# Patient Record
Sex: FEMALE | Race: WHITE | NOT HISPANIC OR LATINO | Employment: FULL TIME | URBAN - METROPOLITAN AREA
[De-identification: names, ages, dates, MRNs, and addresses within clinical notes are randomized per-mention and may not be internally consistent; named-entity substitution may affect disease eponyms.]

---

## 2017-01-03 ENCOUNTER — CLINICAL SUPPORT (OUTPATIENT)
Dept: OBSTETRICS AND GYNECOLOGY | Facility: CLINIC | Age: 22
End: 2017-01-03
Payer: MEDICAID

## 2017-01-03 ENCOUNTER — ROUTINE PRENATAL (OUTPATIENT)
Dept: OBSTETRICS AND GYNECOLOGY | Facility: CLINIC | Age: 22
End: 2017-01-03
Payer: MEDICAID

## 2017-01-03 VITALS
WEIGHT: 190 LBS | SYSTOLIC BLOOD PRESSURE: 110 MMHG | BODY MASS INDEX: 31.62 KG/M2 | HEART RATE: 78 BPM | DIASTOLIC BLOOD PRESSURE: 70 MMHG

## 2017-01-03 DIAGNOSIS — Z34.83 ENCOUNTER FOR SUPERVISION OF OTHER NORMAL PREGNANCY IN THIRD TRIMESTER: Primary | ICD-10-CM

## 2017-01-03 DIAGNOSIS — Z23 NEED FOR TDAP VACCINATION: ICD-10-CM

## 2017-01-03 DIAGNOSIS — O99.810 ABNORMAL GLUCOSE TOLERANCE TEST (GTT) DURING PREGNANCY, ANTEPARTUM: ICD-10-CM

## 2017-01-03 DIAGNOSIS — Z23 NEED FOR TDAP VACCINATION: Primary | ICD-10-CM

## 2017-01-03 PROCEDURE — 99213 OFFICE O/P EST LOW 20 MIN: CPT | Mod: TH,S$PBB,, | Performed by: OBSTETRICS & GYNECOLOGY

## 2017-01-03 PROCEDURE — 99212 OFFICE O/P EST SF 10 MIN: CPT | Mod: PBBFAC,25 | Performed by: OBSTETRICS & GYNECOLOGY

## 2017-01-03 PROCEDURE — 99999 PR PBB SHADOW E&M-EST. PATIENT-LVL II: CPT | Mod: PBBFAC,,, | Performed by: OBSTETRICS & GYNECOLOGY

## 2017-01-03 NOTE — MR AVS SNAPSHOT
[facility]  [address] [address]  Phone: [phone]                  Nolvia Ray   1/3/2017 11:00 AM   Routine Prenatal    Description:  Female : 1995   Provider:  Terrie Hawley MD   Department:  [facility]           Reason for Visit     Routine Prenatal Visit           Diagnoses this Visit        Comments    Encounter for supervision of other normal pregnancy in third trimester    -  Primary     Abnormal glucose tolerance test (GTT) during pregnancy, antepartum         Need for Tdap vaccination                To Do List           Future Appointments        Provider Department Dept Phone    1/3/2017 11:20 AM VIKAS, OB GYN NURSE [facility] OB/ -[phone]    2017 11:45 AM Terrie Hawley MD [facility] OB/ -[phone]      Goals (5 Years of Data)     None      [facility]     [facility] - / Assistance  Registered nurses in the [facility] Center provide clinical advisement, health education, appointment booking, and other advisory services.  Call for this free service at [phone].             Medications           Message regarding Medications     Verify the changes and/or additions to your medication regime listed below are the same as discussed with your clinician today.  If any of these changes or additions are incorrect, please notify your healthcare provider.             Verify that the below list of medications is an accurate representation of the medications you are currently taking.  If none reported, the list may be blank. If incorrect, please contact your healthcare provider. Carry this list with you in case of emergency.           Current Medications     PNV62/FA/OM3/DHA/EPA/FISH OIL (PRENATAL GUMMY ORAL) Take by mouth.           Clinical Reference Information           Prenatal Vitals     Enc. Date GA Prenatal Vitals Prenatal Pulse Pain Level Urine Albumin/Glucose Edema Presentation  Dilation/Effacement/Station    1/3/17 29w1d 110/70 / 86.2 kg (190 lb) 29 cm / 137 / Present 78 0 Negative / Negative None / None / None / No      16 27w1d 110/78 / 85.7 kg (189 lb) 27.5 cm / 162 / Present 78 0 Negative / Negative None / None / None      16 24w1d 120/80 / 84.4 kg (186 lb)  / 148 / Present 82 0 Negative / Negative None / None / None      10/27/16 19w3d 110/70 / 80.7 kg (178 lb)  / 158 (u/s) / Present 85 0 Negative / Negative None / None / None      10/7/16 16w4d 104/69 / 80.3 kg (177 lb)  / 148 79 0 Negative / Negative None / None / None      16 12w1d 108/76 / 80.6 kg (177 lb 9.6 oz)  / 167 85 0 Negative / Negative None / None / None         TW.536 kg (10 lb)   Pregravid weight: 81.6 kg (180 lb)   Number of fetuses: 1       Vital Signs - Last Recorded  Most recent update: 1/3/2017 10:56 AM by Irma Armstrong MA    BP Pulse Wt LMP BMI    110/70 78 86.2 kg (190 lb) 2016 31.62 kg/m2      Allergies as of 1/3/2017     No Known Allergies      Immunizations Administered on Date of Encounter - 1/3/2017     Name Date Dose VIS Date Route    TDAP  Incomplete 0.5 mL 2015 Intramuscular      Orders Placed During Today's Visit      Normal Orders This Visit    Tdap Vaccine (Adult)

## 2017-01-03 NOTE — PROGRESS NOTES
Tdap injection given Right Deltoid per order. Patient instructed to wait 20 minutes after injection administration. Patient verbalized understanding.

## 2017-01-03 NOTE — PROGRESS NOTES
Pt without complaints.  Has ~ one cramp each night while trying to get comfortable in bed.  No vaginal bleeding, leakage of fluid, or contractions.  +FM.  Discussed recent DM screen results.  Pt to do 3 hour GTT tomorrow. Counseled on comfort measures in pregnancy.  PTL precautions. Fetal kick counts. Counseled on Tdap; pt desires.

## 2017-01-04 ENCOUNTER — LAB VISIT (OUTPATIENT)
Dept: LAB | Facility: HOSPITAL | Age: 22
End: 2017-01-04
Attending: OBSTETRICS & GYNECOLOGY
Payer: MEDICAID

## 2017-01-04 DIAGNOSIS — O99.810 ABNORMAL GLUCOSE TOLERANCE TEST (GTT) DURING PREGNANCY, ANTEPARTUM: ICD-10-CM

## 2017-01-04 LAB
GLUCOSE SERPL-MCNC: 132 MG/DL
GLUCOSE SERPL-MCNC: 151 MG/DL
GLUCOSE SERPL-MCNC: 201 MG/DL
GLUCOSE SERPL-MCNC: 96 MG/DL

## 2017-01-04 PROCEDURE — 82951 GLUCOSE TOLERANCE TEST (GTT): CPT

## 2017-01-04 PROCEDURE — 36415 COLL VENOUS BLD VENIPUNCTURE: CPT

## 2017-01-05 DIAGNOSIS — O24.410 DIET CONTROLLED GESTATIONAL DIABETES MELLITUS (GDM) IN THIRD TRIMESTER: ICD-10-CM

## 2017-01-09 ENCOUNTER — TELEPHONE (OUTPATIENT)
Dept: OBSTETRICS AND GYNECOLOGY | Facility: CLINIC | Age: 22
End: 2017-01-09

## 2017-01-09 NOTE — TELEPHONE ENCOUNTER
Diabetic referral faxed to [phone] to Kaylan Lloyd Diabetic Nurse at [facility]. Nutrition referral faxed to [phone] to Dorothy Godinez Dietician at [facility]. Confirmation faxes received. Patient will be contact by referral recipient with appointment information.

## 2017-01-11 ENCOUNTER — ROUTINE PRENATAL (OUTPATIENT)
Dept: OBSTETRICS AND GYNECOLOGY | Facility: CLINIC | Age: 22
End: 2017-01-11
Payer: MEDICAID

## 2017-01-11 ENCOUNTER — HOSPITAL ENCOUNTER (OUTPATIENT)
Dept: DIABETES | Facility: HOSPITAL | Age: 22
Discharge: HOME | End: 2017-01-11
Attending: OBSTETRICS & GYNECOLOGY
Payer: MEDICAID

## 2017-01-11 VITALS
WEIGHT: 194 LBS | BODY MASS INDEX: 32.28 KG/M2 | DIASTOLIC BLOOD PRESSURE: 76 MMHG | SYSTOLIC BLOOD PRESSURE: 112 MMHG | HEART RATE: 72 BPM

## 2017-01-11 DIAGNOSIS — Z34.83 ENCOUNTER FOR SUPERVISION OF OTHER NORMAL PREGNANCY IN THIRD TRIMESTER: Primary | ICD-10-CM

## 2017-01-11 DIAGNOSIS — O26.853 SPOTTING AFFECTING PREGNANCY IN THIRD TRIMESTER: ICD-10-CM

## 2017-01-11 DIAGNOSIS — O24.410 DIET CONTROLLED GESTATIONAL DIABETES MELLITUS (GDM) IN THIRD TRIMESTER: ICD-10-CM

## 2017-01-11 PROCEDURE — 99213 OFFICE O/P EST LOW 20 MIN: CPT | Mod: TH,S$PBB,, | Performed by: OBSTETRICS & GYNECOLOGY

## 2017-01-11 PROCEDURE — 99999 PR PBB SHADOW E&M-EST. PATIENT-LVL II: CPT | Mod: PBBFAC,,, | Performed by: OBSTETRICS & GYNECOLOGY

## 2017-01-11 PROCEDURE — 82731 ASSAY OF FETAL FIBRONECTIN: CPT

## 2017-01-11 PROCEDURE — 99212 OFFICE O/P EST SF 10 MIN: CPT | Mod: PBBFAC | Performed by: OBSTETRICS & GYNECOLOGY

## 2017-01-11 NOTE — PROGRESS NOTES
Pt diagnosed with gestational diabetes.  Saw diabetes educator and nutritionist today.  Has already been checking blood glucose levels.  Pt reports one red spot of blood in underwear yesterday.  Denies trauma, intercourse, heavy lifting.  Denies contractions or leakage of fluid.  Reports good FM. FFN sent.  Cervix closed, thick, high. No bleeding noted.   PTL precautions.  Fetal kick counts.  Ultrasound for growth and MVP in conjunction with next visit. Pt further educated on gestational diabetes and management.

## 2017-01-11 NOTE — PROGRESS NOTES
01/11/17 1116   Reason for Assessment   Reason for Assessment physician consult   Diagnosis diabetes diagnosis/complications  (gestational)   Level of Care Low: 1   Nutrition Risk Screen   Nutrition Risk Screen no indicators present   Nutrition/Diet History   Typical Food/Fluid Intake 3 meals and 3 snacks   Food Preferences string cheese   Meal/Snack Patterns pt taught 3 meals and 3 snacks   Use of Vitamin/Mineral/Herbal Supplements prenatal   Typical Activity Patterns moderate intensity   Functional Status ambulatory;able to purchase food;able to prepare meals   Food Allergies (none)   Factors Affecting Nutritional Intake (pregnancy)   Nutrition Support Formula Prior to Admit (none)   Labs/Tests/Procedures/Meds   Pertinent Labs Reviewed reviewed   Pertinent Labs Comments GTT   Pertinent Medications Reviewed reviewed   Physical Findings/Assessment   Overall Physical Appearance (pregnant)   Tubes (no)   Oral/Mouth Cavity all teeth present (age appropriate)   Skin intact   Pregnancy/Lactation Assessed Needs   (Calorie Requirements) 2200   Estimated Energy Requirements Method estimated energy requirements greater than 19 years (pregnant)   Nutrition Prescription Ordered   Current Diet Order 2200 calorie diabetic   Nutrition Order Comments appropriate for pregnancy   Current Nutrition Support Formula Ordered (n/a)   Nutrition Risk   Level of Risk low   Continuum of Care Plan   Referral to Outpatient Services diabetes education  (completed as outpt today; see pt ed)   Malnutrition (Undernutrition) Diagnosis   % Intake of Estimated Energy Needs 75 - 100%   % Meal Intake 100%   Malnutrition Reason (n/a)   Nutrition Diagnosis   Nutrition Problem Altered nutrition related laboratory values   Etiology/Related To see labs GTT   Nutrition Diagnosis Signs/Symptoms As Evidenced By gestational dm   Nutrition Diagnosis Status Continues   Additional Nutrition Diagnosis? no   Recommendations   Recommendation/Intervention follow  meal plan as taught   Goals to level out BS while pregnant   Nutrition Goal Status progressing towards goal   Communication of RD Recs (refer to notes)   Monitor and Evaluation   Food and Nutrient Intake food and beverage intake   Biochemical Data, Medical Tests and Procedures glucose/endocrine profile   Nutrition Follow-up   RD Follow-up? No  (but pt is to call with any questions. kk)

## 2017-01-11 NOTE — PROGRESS NOTES
01/11/17 0000   Diabetes Type   Diabetes Type  Gestational   Diabetes History   Diabetes Diagnosis 0-1 year   Nutrition   Meal Planning 3 meals per day;snacks between meal   Monitoring    Monitoring Other  ([employer]eli[employer])   Self Monitoring  Is monitoring 3 times a day  (I instructed her to monitor 4 times a day (fasting and  1 or 2 hours after 3 main meals)   Blood Glucose Logs Yes   Exercise    Exercise Type walking   Intensity Low   Frequency Daily   Duration 30 min   Current Diabetes Treatment    Current Treatment Diet   Social History   Preferred Learning Method Face to Face;Demonstration   Primary Support Other  (Mother)   Educational Level High School   Occupation Doesn't work. Lives with mother   Smoking Status Ex Smoker   Alcohol Use Never   Barriers to Change   Barriers to Change None   Learning Challenges  None   Readiness to Learn    Readiness to Learn  Eager  (Her mother, Damaris, is with her and both are eager to learn)   Cultural Influences    Cultural Influences  Both deny any   Diabetes Education Visit   Diabetes Education Record Assessment/Progress Initial/Comprehensive   Diabetes Education Assessment/Progress   Acute Complications (preventing, detecting, and treating acute complications) L;DC;W;IS;1;2   Chronic Complications (preventing, detecting, and treating chronic complications) L;DC;IS;1;W;2   Diabetes Disease Process (diabetes disease process and treatment options) L;DC;IS;1;2;W   Nutrition (Incorporating nutritional management into one's lifestyle) DC;IS;1;2  (Is meeting with the Dietician following this session)   Physical Activity (incorporating physical activity into one's lifestyle) L;DC;IS;1;2;W   Medications (states correct name, dose, onset, peak, duration, side effects & timing of meds) DC;IS;1;2  (On no medications at this time)   Monitoring (monitoring blood glucose/other parameters &using results) L;DC;IS;1;2;W   Goal Setting and Problem Solving (verbalizes behavior change strategies &  sets realistic goals) L;DC;IS;1;2;W   Behavior Change (developing personal strategies to health & behavior change) L;IS;1;2   Psychosocial Issues (deveopling personal srategies to address psychosocial concerns) L;DC;IS;1;2   Goals   Monitoring Set   Start Date 01/11/17  (will monitor 4 times daily and maintain log book to bring her to MD appt)   Diabetes Care Plan/Intervention   Education Plan/Intervention In F/U MNT   Diabetes Self-Management Support Plan F/U Prov   Education Units of Time    Time Spent 30 min

## 2017-01-11 NOTE — MR AVS SNAPSHOT
[facility]  [address] [place] [address]  Phone: [phone]                  Nolvia Ray   2017 3:15 PM   Routine Prenatal    Description:  Female : 1995   Provider:  Terrie Hawley MD   Department:  [facility]           Reason for Visit     Routine Prenatal Visit           Diagnoses this Visit        Comments    Encounter for supervision of other normal pregnancy in third trimester    -  Primary     Diet controlled gestational diabetes mellitus (GDM) in third trimester         Spotting affecting pregnancy in third trimester                To Do List           Future Appointments        Provider Department Dept Phone    2017 4:30 PM Terrie Hawley MD [facility] OB/ -[phone]      Goals (5 Years of Data)     None      Follow-Up and Disposition     Return in about 2 weeks (around 2017).      [facility]     [facility] On Call Nurse Care Line -  Assistance  Registered nurses in the [facility] On Call Center provide clinical advisement, health education, appointment booking, and other advisory services.  Call for this free service at [phone].             Medications           Message regarding Medications     Verify the changes and/or additions to your medication regime listed below are the same as discussed with your clinician today.  If any of these changes or additions are incorrect, please notify your healthcare provider.             Verify that the below list of medications is an accurate representation of the medications you are currently taking.  If none reported, the list may be blank. If incorrect, please contact your healthcare provider. Carry this list with you in case of emergency.           Current Medications     PNV62/FA/OM3/DHA/EPA/FISH OIL (PRENATAL GUMMY ORAL) Take by mouth.           Clinical Reference Information           Prenatal Vitals     Enc. Date GA Prenatal Vitals Prenatal Pulse Pain Level Urine  Albumin/Glucose Edema Presentation Dilation/Effacement/Station    17 30w2d 112/76 / 88 kg (194 lb) 31 cm / 140 / Present 72 0 Negative / Negative None / None / None / No  0 / 0 / -5    1/3/17 29w1d 110/70 / 86.2 kg (190 lb) 29 cm / 137 / Present 78 0 Negative / Negative None / None / None / No      16 27w1d 110/78 / 85.7 kg (189 lb) 27.5 cm / 162 / Present 78 0 Negative / Negative None / None / None      16 24w1d 120/80 / 84.4 kg (186 lb)  / 148 / Present 82 0 Negative / Negative None / None / None      10/27/16 19w3d 110/70 / 80.7 kg (178 lb)  / 158 (u/s) / Present 85 0 Negative / Negative None / None / None      10/7/16 16w4d 104/69 / 80.3 kg (177 lb)  / 148 79 0 Negative / Negative None / None / None      16 12w1d 108/76 / 80.6 kg (177 lb 9.6 oz)  / 167 85 0 Negative / Negative None / None / None         TW.35 kg (14 lb)   Pregravid weight: 81.6 kg (180 lb)   Number of fetuses: 1       Vital Signs - Last Recorded  Most recent update: 2017  3:20 PM by Rachele Augustine MA    BP Pulse Wt LMP BMI    112/76 72 88 kg (194 lb) 2016 32.28 kg/m2      Allergies as of 2017     No Known Allergies      Immunizations Administered on Date of Encounter - 2017     None      Orders Placed During Today's Visit     Future Labs/Procedures Expected by Expires    Fetal fibronectin 30 weeks 2 days  2017 3/12/2018    GLUCOSE, 2 HR. PC  2017 3/12/2018    Glucose, fasting  As directed 2018

## 2017-01-12 LAB — FIBRONECTIN FETAL SPEC QL: NEGATIVE

## 2017-01-25 ENCOUNTER — PROCEDURE VISIT (OUTPATIENT)
Dept: OBSTETRICS AND GYNECOLOGY | Facility: CLINIC | Age: 22
End: 2017-01-25
Payer: MEDICAID

## 2017-01-25 ENCOUNTER — ROUTINE PRENATAL (OUTPATIENT)
Dept: OBSTETRICS AND GYNECOLOGY | Facility: CLINIC | Age: 22
End: 2017-01-25
Payer: MEDICAID

## 2017-01-25 ENCOUNTER — TELEPHONE (OUTPATIENT)
Dept: OBSTETRICS AND GYNECOLOGY | Facility: CLINIC | Age: 22
End: 2017-01-25

## 2017-01-25 VITALS
DIASTOLIC BLOOD PRESSURE: 67 MMHG | HEART RATE: 76 BPM | WEIGHT: 194 LBS | BODY MASS INDEX: 32.28 KG/M2 | SYSTOLIC BLOOD PRESSURE: 118 MMHG

## 2017-01-25 DIAGNOSIS — O24.410 DIET CONTROLLED GESTATIONAL DIABETES MELLITUS (GDM) IN THIRD TRIMESTER: Primary | ICD-10-CM

## 2017-01-25 DIAGNOSIS — O24.410 DIET CONTROLLED GESTATIONAL DIABETES MELLITUS (GDM) IN THIRD TRIMESTER: ICD-10-CM

## 2017-01-25 DIAGNOSIS — Z34.83 ENCOUNTER FOR SUPERVISION OF OTHER NORMAL PREGNANCY IN THIRD TRIMESTER: Primary | ICD-10-CM

## 2017-01-25 PROCEDURE — 99213 OFFICE O/P EST LOW 20 MIN: CPT | Mod: TH,S$PBB,, | Performed by: OBSTETRICS & GYNECOLOGY

## 2017-01-25 PROCEDURE — 76805 OB US >/= 14 WKS SNGL FETUS: CPT | Mod: PBBFAC | Performed by: OBSTETRICS & GYNECOLOGY

## 2017-01-25 PROCEDURE — 99999 PR PBB SHADOW E&M-EST. PATIENT-LVL II: CPT | Mod: PBBFAC,,, | Performed by: OBSTETRICS & GYNECOLOGY

## 2017-01-25 PROCEDURE — 76805 OB US >/= 14 WKS SNGL FETUS: CPT | Mod: 26,S$PBB,, | Performed by: OBSTETRICS & GYNECOLOGY

## 2017-01-25 NOTE — TELEPHONE ENCOUNTER
Please schedule patient for ultrasound today prior to her visit this afternoon if there is availability.  Orders placed.

## 2017-01-25 NOTE — PROGRESS NOTES
Pt without complaints.  No vaginal bleeding, leakage of fluid, or contractions.  +FM. Doing well with diet.  Fasting and 2 hour postprandial glucose normal today.  Reviewed today's ultrasound findings. PTL precautions given.  Fetal kick counts.  Will do NST in conjunction with next visit.

## 2017-01-25 NOTE — MR AVS SNAPSHOT
[facility]  [address] [address]  Phone: [phone]                  Nolvia Ray   2017 4:30 PM   Routine Prenatal    Description:  Female : 1995   Provider:  Terrie Hawley MD   Department:  [facility]           Reason for Visit     Routine Prenatal Visit           Diagnoses this Visit        Comments    Encounter for supervision of other normal pregnancy in third trimester    -  Primary     Diet controlled gestational diabetes mellitus (GDM) in third trimester                To Do List           Future Appointments        Provider Department Dept Phone    2017 2:00 PM Eldridge, OB GYN NURSE [facility] OB/ -[phone]    2017 2:30 PM eTrrie Hawley MD [facility] OB/ -[phone]      Goals (5 Years of Data)     None      Follow-Up and Disposition     Return in about 2 weeks (around 2017).      [facility]     [facility] On Call Nurse Care Line -  Assistance  Registered nurses in the [facility] On Call Center provide clinical advisement, health education, appointment booking, and other advisory services.  Call for this free service at [phone].             Medications           Message regarding Medications     Verify the changes and/or additions to your medication regime listed below are the same as discussed with your clinician today.  If any of these changes or additions are incorrect, please notify your healthcare provider.             Verify that the below list of medications is an accurate representation of the medications you are currently taking.  If none reported, the list may be blank. If incorrect, please contact your healthcare provider. Carry this list with you in case of emergency.           Current Medications     PNV62/FA/OM3/DHA/EPA/FISH OIL (PRENATAL GUMMY ORAL) Take by mouth.           Clinical Reference Information           Prenatal Vitals     Enc. Date GA Prenatal Vitals Prenatal Pulse Pain Level  Urine Albumin/Glucose Edema Presentation Dilation/Effacement/Station    17 32w2d 118/67 / 88 kg (194 lb) 32 cm / 144 (u/s) / Present 76 0 Negative / Negative None / None / None / No Vertex     17 30w2d 112/76 / 88 kg (194 lb) 31 cm / 140 / Present 72 0 Negative / Negative None / None / None / No  0 / 0 / -5    1/3/17 29w1d 110/70 / 86.2 kg (190 lb) 29 cm / 137 / Present 78 0 Negative / Negative None / None / None / No      16 27w1d 110/78 / 85.7 kg (189 lb) 27.5 cm / 162 / Present 78 0 Negative / Negative None / None / None      16 24w1d 120/80 / 84.4 kg (186 lb)  / 148 / Present 82 0 Negative / Negative None / None / None      10/27/16 19w3d 110/70 / 80.7 kg (178 lb)  / 158 (u/s) / Present 85 0 Negative / Negative None / None / None      10/7/16 16w4d 104/69 / 80.3 kg (177 lb)  / 148 79 0 Negative / Negative None / None / None      16 12w1d 108/76 / 80.6 kg (177 lb 9.6 oz)  / 167 85 0 Negative / Negative None / None / None         TW.35 kg (14 lb)   Pregravid weight: 81.6 kg (180 lb)   Number of fetuses: 1       Vital Signs - Last Recorded  Most recent update: 2017  4:28 PM by Rachele Augustine MA    BP Pulse Wt LMP BMI    118/67 76 88 kg (194 lb) 2016 32.28 kg/m2      Allergies as of 2017     No Known Allergies      Immunizations Administered on Date of Encounter - 2017     None      Orders Placed During Today's Visit     Future Labs/Procedures Expected by Expires    Fetal non-stress test  2017      [facility] Sign-Up     Activating your [facility] account is as easy as 1-2-3!     1) Visit [email or web address], select Sign Up Now, enter this activation code and your date of birth, then select Next.  [email or web address]  Expires: 3/11/2017  4:55 PM      2) Create a username and password to use when you visit [facility] in the future and select a security question in case you lose your password and select Next.    3) Enter your e-mail address and click Sign  Up!    Additional Information  If you have questions, please e-mail [email or web address] or call [phone] to talk to our [facility] staff. Remember, [facility] is NOT to be used for urgent needs. For medical emergencies, dial 911.

## 2017-02-08 ENCOUNTER — ROUTINE PRENATAL (OUTPATIENT)
Dept: OBSTETRICS AND GYNECOLOGY | Facility: CLINIC | Age: 22
End: 2017-02-08
Payer: MEDICAID

## 2017-02-08 ENCOUNTER — CLINICAL SUPPORT (OUTPATIENT)
Dept: OBSTETRICS AND GYNECOLOGY | Facility: CLINIC | Age: 22
End: 2017-02-08
Payer: MEDICAID

## 2017-02-08 VITALS
SYSTOLIC BLOOD PRESSURE: 122 MMHG | WEIGHT: 199 LBS | BODY MASS INDEX: 33.12 KG/M2 | HEART RATE: 78 BPM | DIASTOLIC BLOOD PRESSURE: 82 MMHG

## 2017-02-08 DIAGNOSIS — O24.410 DIET CONTROLLED GESTATIONAL DIABETES MELLITUS (GDM) IN THIRD TRIMESTER: ICD-10-CM

## 2017-02-08 DIAGNOSIS — Z34.83 ENCOUNTER FOR SUPERVISION OF OTHER NORMAL PREGNANCY IN THIRD TRIMESTER: ICD-10-CM

## 2017-02-08 DIAGNOSIS — Z34.83 ENCOUNTER FOR SUPERVISION OF OTHER NORMAL PREGNANCY IN THIRD TRIMESTER: Primary | ICD-10-CM

## 2017-02-08 LAB
ACCELERATIONS > 10BPM: 4
ACCELERATIONS > 15 BPM: 4
ACOUSTIC STIMULATION: NO
DECELERATIONS: NORMAL
FHR VARIABILITIES: NORMAL
NST ASSESSMENT: REACTIVE
NST BASELINE: 130
NST DURATION: 20 MINUTES
NST INDICATIONS: NORMAL
NST LOCATIONS: NORMAL
NST READ BY: NORMAL
NST RETURN: NORMAL
UTERINE ACTIVITY: YES

## 2017-02-08 PROCEDURE — 99999 PR PBB SHADOW E&M-EST. PATIENT-LVL II: CPT | Mod: PBBFAC,,, | Performed by: OBSTETRICS & GYNECOLOGY

## 2017-02-08 PROCEDURE — 99213 OFFICE O/P EST LOW 20 MIN: CPT | Mod: 25,TH,S$PBB, | Performed by: OBSTETRICS & GYNECOLOGY

## 2017-02-08 PROCEDURE — 59025 FETAL NON-STRESS TEST: CPT | Mod: PBBFAC | Performed by: OBSTETRICS & GYNECOLOGY

## 2017-02-08 PROCEDURE — 59025 FETAL NON-STRESS TEST: CPT | Mod: 26,S$PBB,, | Performed by: OBSTETRICS & GYNECOLOGY

## 2017-02-08 PROCEDURE — 99212 OFFICE O/P EST SF 10 MIN: CPT | Mod: PBBFAC,25 | Performed by: OBSTETRICS & GYNECOLOGY

## 2017-02-08 NOTE — PROGRESS NOTES
Pt without complaints.  No vaginal bleeding, leakage of fluid, or contractions.  +FM.  Blood glucose levels have been within normal range. Pt continuing to follow gestational diabetic diet.  NST reviewed; reactive.  PTL precautions.  Fetal kick counts.  Will do BPP in conjunction with next visit.

## 2017-02-16 ENCOUNTER — ROUTINE PRENATAL (OUTPATIENT)
Dept: OBSTETRICS AND GYNECOLOGY | Facility: CLINIC | Age: 22
End: 2017-02-16
Payer: MEDICAID

## 2017-02-16 ENCOUNTER — PROCEDURE VISIT (OUTPATIENT)
Dept: OBSTETRICS AND GYNECOLOGY | Facility: CLINIC | Age: 22
End: 2017-02-16
Payer: MEDICAID

## 2017-02-16 VITALS
HEART RATE: 77 BPM | DIASTOLIC BLOOD PRESSURE: 68 MMHG | SYSTOLIC BLOOD PRESSURE: 114 MMHG | BODY MASS INDEX: 33.78 KG/M2 | WEIGHT: 203 LBS

## 2017-02-16 DIAGNOSIS — O24.410 DIET CONTROLLED GESTATIONAL DIABETES MELLITUS (GDM) IN THIRD TRIMESTER: ICD-10-CM

## 2017-02-16 DIAGNOSIS — Z34.83 ENCOUNTER FOR SUPERVISION OF OTHER NORMAL PREGNANCY IN THIRD TRIMESTER: Primary | ICD-10-CM

## 2017-02-16 DIAGNOSIS — Z34.83 ENCOUNTER FOR SUPERVISION OF OTHER NORMAL PREGNANCY IN THIRD TRIMESTER: ICD-10-CM

## 2017-02-16 PROCEDURE — 99999 PR PBB SHADOW E&M-EST. PATIENT-LVL II: CPT | Mod: PBBFAC,,, | Performed by: OBSTETRICS & GYNECOLOGY

## 2017-02-16 PROCEDURE — 76818 FETAL BIOPHYS PROFILE W/NST: CPT | Mod: 26,S$PBB,, | Performed by: OBSTETRICS & GYNECOLOGY

## 2017-02-16 PROCEDURE — 99213 OFFICE O/P EST LOW 20 MIN: CPT | Mod: TH,S$PBB,, | Performed by: OBSTETRICS & GYNECOLOGY

## 2017-02-16 PROCEDURE — 76818 FETAL BIOPHYS PROFILE W/NST: CPT | Mod: PBBFAC | Performed by: OBSTETRICS & GYNECOLOGY

## 2017-02-16 NOTE — PROGRESS NOTES
Pt reports irregular contractions.  Denies vaginal bleeding and leakage of fluid.  Reports good FM. Reports blood glucose levels have been in nette limits.  Reviewed today's ultrasound findings.  PTL precautions.  Fetal kick counts. Continue glucose control.

## 2017-02-18 LAB — BACTERIA SPEC AEROBE CULT: NORMAL

## 2017-02-23 ENCOUNTER — CLINICAL SUPPORT (OUTPATIENT)
Dept: OBSTETRICS AND GYNECOLOGY | Facility: CLINIC | Age: 22
End: 2017-02-23
Payer: MEDICAID

## 2017-02-23 ENCOUNTER — ROUTINE PRENATAL (OUTPATIENT)
Dept: OBSTETRICS AND GYNECOLOGY | Facility: CLINIC | Age: 22
End: 2017-02-23
Payer: MEDICAID

## 2017-02-23 VITALS
WEIGHT: 203 LBS | DIASTOLIC BLOOD PRESSURE: 78 MMHG | BODY MASS INDEX: 33.78 KG/M2 | HEART RATE: 76 BPM | SYSTOLIC BLOOD PRESSURE: 120 MMHG

## 2017-02-23 DIAGNOSIS — Z34.83 ENCOUNTER FOR SUPERVISION OF OTHER NORMAL PREGNANCY IN THIRD TRIMESTER: Primary | ICD-10-CM

## 2017-02-23 DIAGNOSIS — O24.410 DIET CONTROLLED GESTATIONAL DIABETES MELLITUS (GDM) IN THIRD TRIMESTER: ICD-10-CM

## 2017-02-23 LAB
ACCELERATIONS > 10BPM: 5
ACCELERATIONS > 15 BPM: 5
ACOUSTIC STIMULATION: NO
DECELERATIONS: NORMAL
FHR VARIABILITIES: NORMAL
NST ASSESSMENT: REACTIVE
NST BASELINE: 125
NST DURATION: 30 MINUTES
NST INDICATIONS: NORMAL
NST LOCATIONS: NORMAL
NST READ BY: NORMAL
NST RETURN: NORMAL
UTERINE ACTIVITY: YES

## 2017-02-23 PROCEDURE — 99212 OFFICE O/P EST SF 10 MIN: CPT | Mod: PBBFAC | Performed by: OBSTETRICS & GYNECOLOGY

## 2017-02-23 PROCEDURE — 99999 PR PBB SHADOW E&M-EST. PATIENT-LVL II: CPT | Mod: PBBFAC,,, | Performed by: OBSTETRICS & GYNECOLOGY

## 2017-02-23 PROCEDURE — 99213 OFFICE O/P EST LOW 20 MIN: CPT | Mod: TH,S$PBB,, | Performed by: OBSTETRICS & GYNECOLOGY

## 2017-02-23 NOTE — PROGRESS NOTES
Pt without complaints.  Blood glucose levels have been in normal range.  Has had irregular contractions. No vaginal bleeding or leakage of fluid. +FM. Counseled on negative GBS results.  NST reactive.  PTL precautions.  Fetal kick counts.  BPP in conjunction with next visit.

## 2017-02-23 NOTE — MR AVS SNAPSHOT
[facility]  [address] [place] [address]  Phone: [phone]                  Nolvia Ray   2017 2:45 PM   Routine Prenatal    Description:  Female : 1995   Provider:  Terrie Hawley MD   Department:  [facility]           Reason for Visit     Routine Prenatal Visit           Diagnoses this Visit        Comments    Encounter for supervision of other normal pregnancy in third trimester    -  Primary     Diet controlled gestational diabetes mellitus (GDM) in third trimester                To Do List           Future Appointments        Provider Department Dept Phone    3/2/2017 10:45 AM Terrie Hawley MD [facility] OB/ -[phone]      Goals (5 Years of Data)     None      Follow-Up and Disposition     Return in about 1 week (around 3/2/2017).      [facility]     [facility] Nurse Care Line -  Assistance  Registered nurses in the [facility] Center provide clinical advisement, health education, appointment booking, and other advisory services.  Call for this free service at [phone].             Medications           Message regarding Medications     Verify the changes and/or additions to your medication regime listed below are the same as discussed with your clinician today.  If any of these changes or additions are incorrect, please notify your healthcare provider.             Verify that the below list of medications is an accurate representation of the medications you are currently taking.  If none reported, the list may be blank. If incorrect, please contact your healthcare provider. Carry this list with you in case of emergency.           Current Medications     PNV62/FA/OM3/DHA/EPA/FISH OIL (PRENATAL GUMMY ORAL) Take by mouth.           Clinical Reference Information           Prenatal Vitals     Enc. Date GA Prenatal Vitals Prenatal Pulse Pain Level Urine Albumin/Glucose Edema Presentation Dilation/Effacement/Station     2/23/17 36w3d 120/78 / 92.1 kg (203 lb) 36 cm / 125 / Present 76 0 Negative / Negative None / None / None / No Vertex 0 / 50 / -4    2/16/17 35w3d 114/68 / 92.1 kg (203 lb) 35 cm / 161 (u/s) / Present 77 0 Negative / Negative None / None / None / No Vertex 0 / 30 / -4    2/8/17 34w2d 122/82 / 90.3 kg (199 lb) 34 cm / 138 / Present 78 0 Negative / Negative None / None / None / No      1/25/17 32w2d 118/67 / 88 kg (194 lb) 32 cm / 144 (u/s) / Present 76 0 Negative / Negative None / None / None / No Vertex     1/11/17 30w2d 112/76 / 88 kg (194 lb) 31 cm / 140 / Present 72 0 Negative / Negative None / None / None / No  0 / 0 / -5    1/3/17 29w1d 110/70 / 86.2 kg (190 lb) 29 cm / 137 / Present 78 0 Negative / Negative None / None / None / No      12/20/16 27w1d 110/78 / 85.7 kg (189 lb) 27.5 cm / 162 / Present 78 0 Negative / Negative None / None / None      11/29/16 24w1d 120/80 / 84.4 kg (186 lb)  / 148 / Present 82 0 Negative / Negative None / None / None      10/27/16 19w3d 110/70 / 80.7 kg (178 lb)  / 158 (u/s) / Present 85 0 Negative / Negative None / None / None      10/7/16 16w4d 104/69 / 80.3 kg (177 lb)  / 148 79 0 Negative / Negative None / None / None      9/6/16 12w1d 108/76 / 80.6 kg (177 lb 9.6 oz)  / 167 85 0 Negative / Negative None / None / None         TWG: 10.4 kg (23 lb)   Pregravid weight: 81.6 kg (180 lb)   Number of fetuses: 1       Your Vitals Were     BP Pulse Weight Last Period BMI    120/78 76 92.1 kg (203 lb) 06/13/2016 33.78 kg/m2      Allergies as of 2/23/2017     No Known Allergies      Immunizations Administered on Date of Encounter - 2/23/2017     None      Orders Placed During Today's Visit      Normal Orders This Visit    Fetal non-stress test     Future Labs/Procedures Expected by Expires    US OB/GYN Procedure (Viewpoint) - Extended List - Future  As directed 2/23/2018      Language Assistance Services     ATTENTION: Language assistance services are available, free of charge. Please  call [phone].      ATENCIÓN: Si habla español, tiene a montoya disposición servicios gratuitos de asistencia lingüística. Llame al [phone].     CHÚ Ý: N?u b?n nói Ti?ng Vi?t, có các d?ch v? h? tr? ngôn ng? mi?n phí dành cho b?n. G?i s? [phone].         [facility] complies with applicable Federal civil rights laws and does not discriminate on the basis of race, color, national origin, age, disability, or sex.

## 2017-03-02 ENCOUNTER — ROUTINE PRENATAL (OUTPATIENT)
Dept: OBSTETRICS AND GYNECOLOGY | Facility: CLINIC | Age: 22
End: 2017-03-02
Payer: MEDICAID

## 2017-03-02 ENCOUNTER — PROCEDURE VISIT (OUTPATIENT)
Dept: OBSTETRICS AND GYNECOLOGY | Facility: CLINIC | Age: 22
End: 2017-03-02
Payer: MEDICAID

## 2017-03-02 VITALS
HEART RATE: 76 BPM | SYSTOLIC BLOOD PRESSURE: 122 MMHG | WEIGHT: 205 LBS | DIASTOLIC BLOOD PRESSURE: 82 MMHG | BODY MASS INDEX: 34.11 KG/M2

## 2017-03-02 DIAGNOSIS — O24.410 DIET CONTROLLED GESTATIONAL DIABETES MELLITUS (GDM) IN THIRD TRIMESTER: ICD-10-CM

## 2017-03-02 DIAGNOSIS — Z34.83 ENCOUNTER FOR SUPERVISION OF OTHER NORMAL PREGNANCY IN THIRD TRIMESTER: Primary | ICD-10-CM

## 2017-03-02 DIAGNOSIS — Z34.83 ENCOUNTER FOR SUPERVISION OF OTHER NORMAL PREGNANCY IN THIRD TRIMESTER: ICD-10-CM

## 2017-03-02 PROCEDURE — 99212 OFFICE O/P EST SF 10 MIN: CPT | Mod: PBBFAC,25 | Performed by: OBSTETRICS & GYNECOLOGY

## 2017-03-02 PROCEDURE — 76818 FETAL BIOPHYS PROFILE W/NST: CPT | Mod: 26,S$PBB,, | Performed by: OBSTETRICS & GYNECOLOGY

## 2017-03-02 PROCEDURE — 99213 OFFICE O/P EST LOW 20 MIN: CPT | Mod: TH,S$PBB,, | Performed by: OBSTETRICS & GYNECOLOGY

## 2017-03-02 PROCEDURE — 99999 PR PBB SHADOW E&M-EST. PATIENT-LVL II: CPT | Mod: PBBFAC,,, | Performed by: OBSTETRICS & GYNECOLOGY

## 2017-03-02 NOTE — PROGRESS NOTES
Pt reports right lower pelvic pressure.  Pt without complaints otherwise.  No vaginal bleeding, leakage of fluid, or contractions.  +FM.  Reports normal blood glucose levels on diet.  Labor precautions.  Fetal kick counts.  BPP today.  NST in conjunction with visit next week.

## 2017-03-02 NOTE — MR AVS SNAPSHOT
[facility] OB/ GYN  [address] [address]  Phone: [phone]                  Nolvia Ray   3/2/2017 10:45 AM   Routine Prenatal    Description:  Female : 1995   Provider:  Terrie Hawley MD   Department:  [facility] OB/ GYN           Reason for Visit     Routine Prenatal Visit           Diagnoses this Visit        Comments    Encounter for supervision of other normal pregnancy in third trimester    -  Primary     Diet controlled gestational diabetes mellitus (GDM) in third trimester                To Do List           Future Appointments        Provider Department Dept Phone    3[facility]10/2017 11:[facility] OB/ -[phone]    3/10/2017 12:00 PM Terrie Hawley MD [facility] OB/ -[phone]      Goals (5 Years of Data)     None      [facility]     [facility] Care [facility] -  Assistance  Registered nurses in the [facility] On Call Center provide clinical advisement, health education, appointment booking, and other advisory services.  Call for this free service at [phone].             Medications           Message regarding Medications     Verify the changes and/or additions to your medication regime listed below are the same as discussed with your clinician today.  If any of these changes or additions are incorrect, please notify your healthcare provider.             Verify that the below list of medications is an accurate representation of the medications you are currently taking.  If none reported, the list may be blank. If incorrect, please contact your healthcare provider. Carry this list with you in case of emergency.           Current Medications     PNV62/FA/OM3/DHA/EPA/FISH OIL (PRENATAL GUMMY ORAL) Take by mouth.           Clinical Reference Information           Prenatal Vitals     Enc. Date GA Prenatal Vitals Prenatal Pulse Pain Level Urine Albumin/Glucose Edema Presentation Dilation/Effacement/Station     3/2/17 37w3d 122/82 / 93 kg (205 lb) 37 cm / 138 / Present 76 0 Negative / Negative None / None / None / No Vertex 0 / 70 / -3    17 36w3d 120/78 / 92.1 kg (203 lb) 36 cm / 125 / Present 76 0 Negative / Negative None / None / None / No Vertex 0 / 50 / -4    17 35w3d 114/68 / 92.1 kg (203 lb) 35 cm / 161 (u/s) / Present 77 0 Negative / Negative None / None / None / No Vertex 0 / 30 / -4    17 34w2d 122/82 / 90.3 kg (199 lb) 34 cm / 138 / Present 78 0 Negative / Negative None / None / None / No      17 32w2d 118/67 / 88 kg (194 lb) 32 cm / 144 (u/s) / Present 76 0 Negative / Negative None / None / None / No Vertex     17 30w2d 112/76 / 88 kg (194 lb) 31 cm / 140 / Present 72 0 Negative / Negative None / None / None / No  0 / 0 / -5    1/3/17 29w1d 110/70 / 86.2 kg (190 lb) 29 cm / 137 / Present 78 0 Negative / Negative None / None / None / No      16 27w1d 110/78 / 85.7 kg (189 lb) 27.5 cm / 162 / Present 78 0 Negative / Negative None / None / None      16 24w1d 120/80 / 84.4 kg (186 lb)  / 148 / Present 82 0 Negative / Negative None / None / None      10/27/16 19w3d 110/70 / 80.7 kg (178 lb)  / 158 (u/s) / Present 85 0 Negative / Negative None / None / None      10/7/16 16w4d 104/69 / 80.3 kg (177 lb)  / 148 79 0 Negative / Negative None / None / None      16 12w1d 108/76 / 80.6 kg (177 lb 9.6 oz)  / 167 85 0 Negative / Negative None / None / None         TW.3 kg (25 lb)   Pregravid weight: 81.6 kg (180 lb)   Number of fetuses: 1       Your Vitals Were     BP                   122/82           Allergies as of 3/2/2017     No Known Allergies      Immunizations Administered on Date of Encounter - 3/2/2017     None      Orders Placed During Today's Visit     Future Labs/Procedures Expected by Expires    Fetal non-stress test  3/2/2017 3/3/2018      Language Assistance Services     ATTENTION: Language assistance services are available, free of charge. Please call  [phone].      ATENCIÓN: Si habla español, tiene a montoya disposición servicios gratuitos de asistencia lingüística. Llame al [phone].     CHÚ Ý: N?u b?n nói Ti?ng Vi?t, có các d?ch v? h? tr? ngôn ng? mi?n phí dành cho b?n. G?i s? [phone].         [place] - OB/ GYN complies with applicable Federal civil rights laws and does not discriminate on the basis of race, color, national origin, age, disability, or sex.

## 2017-03-10 ENCOUNTER — CLINICAL SUPPORT (OUTPATIENT)
Dept: OBSTETRICS AND GYNECOLOGY | Facility: CLINIC | Age: 22
End: 2017-03-10
Payer: MEDICAID

## 2017-03-10 ENCOUNTER — ROUTINE PRENATAL (OUTPATIENT)
Dept: OBSTETRICS AND GYNECOLOGY | Facility: CLINIC | Age: 22
End: 2017-03-10
Payer: MEDICAID

## 2017-03-10 VITALS
SYSTOLIC BLOOD PRESSURE: 118 MMHG | HEART RATE: 78 BPM | WEIGHT: 211 LBS | BODY MASS INDEX: 35.11 KG/M2 | DIASTOLIC BLOOD PRESSURE: 76 MMHG

## 2017-03-10 DIAGNOSIS — Z34.83 ENCOUNTER FOR SUPERVISION OF OTHER NORMAL PREGNANCY IN THIRD TRIMESTER: ICD-10-CM

## 2017-03-10 DIAGNOSIS — O24.410 DIET CONTROLLED GESTATIONAL DIABETES MELLITUS (GDM) IN THIRD TRIMESTER: ICD-10-CM

## 2017-03-10 DIAGNOSIS — Z34.83 ENCOUNTER FOR SUPERVISION OF OTHER NORMAL PREGNANCY IN THIRD TRIMESTER: Primary | ICD-10-CM

## 2017-03-10 LAB
ACCELERATIONS > 10BPM: 6
ACCELERATIONS > 15 BPM: 6
ACOUSTIC STIMULATION: NO
DECELERATIONS: NORMAL
FHR VARIABILITIES: NORMAL
NST ASSESSMENT: REACTIVE
NST BASELINE: 135
NST DURATION: 20 MINUTES
NST INDICATIONS: NORMAL
NST LOCATIONS: NORMAL
NST READ BY: NORMAL
NST RETURN: NORMAL
UTERINE ACTIVITY: YES

## 2017-03-10 PROCEDURE — 59025 FETAL NON-STRESS TEST: CPT | Mod: PBBFAC | Performed by: OBSTETRICS & GYNECOLOGY

## 2017-03-10 PROCEDURE — 59025 FETAL NON-STRESS TEST: CPT | Mod: 26,S$PBB,, | Performed by: OBSTETRICS & GYNECOLOGY

## 2017-03-10 PROCEDURE — 99212 OFFICE O/P EST SF 10 MIN: CPT | Mod: PBBFAC,25 | Performed by: OBSTETRICS & GYNECOLOGY

## 2017-03-10 PROCEDURE — 99999 PR PBB SHADOW E&M-EST. PATIENT-LVL II: CPT | Mod: PBBFAC,,, | Performed by: OBSTETRICS & GYNECOLOGY

## 2017-03-10 PROCEDURE — 99213 OFFICE O/P EST LOW 20 MIN: CPT | Mod: 25,TH,S$PBB, | Performed by: OBSTETRICS & GYNECOLOGY

## 2017-03-10 RX ORDER — OXYTOCIN/RINGER'S LACTATE 20/1000 ML
2 PLASTIC BAG, INJECTION (ML) INTRAVENOUS CONTINUOUS
Status: CANCELLED | OUTPATIENT
Start: 2017-03-22

## 2017-03-10 RX ORDER — KETOROLAC TROMETHAMINE 30 MG/ML
30 INJECTION, SOLUTION INTRAMUSCULAR; INTRAVENOUS EVERY 6 HOURS
Status: CANCELLED | OUTPATIENT
Start: 2017-03-10 | End: 2017-03-11

## 2017-03-10 RX ORDER — SODIUM CHLORIDE, SODIUM LACTATE, POTASSIUM CHLORIDE, CALCIUM CHLORIDE 600; 310; 30; 20 MG/100ML; MG/100ML; MG/100ML; MG/100ML
INJECTION, SOLUTION INTRAVENOUS CONTINUOUS
Status: CANCELLED | OUTPATIENT
Start: 2017-03-10

## 2017-03-10 RX ORDER — BUTORPHANOL TARTRATE 1 MG/ML
1 INJECTION INTRAMUSCULAR; INTRAVENOUS
Status: CANCELLED | OUTPATIENT
Start: 2017-03-10

## 2017-03-10 RX ORDER — ONDANSETRON 2 MG/ML
4 INJECTION INTRAMUSCULAR; INTRAVENOUS EVERY 6 HOURS PRN
Status: CANCELLED | OUTPATIENT
Start: 2017-03-10

## 2017-03-10 RX ORDER — IBUPROFEN 200 MG
800 TABLET ORAL EVERY 8 HOURS
Status: CANCELLED | OUTPATIENT
Start: 2017-03-11

## 2017-03-10 NOTE — MR AVS SNAPSHOT
[place] - OB/ GYN  [address] [address]  Phone: [phone]                  Nolvia Ray   3/10/2017 12:00 PM   Routine Prenatal    Description:  Female : 1995   Provider:  Terrie Hawley MD   Department:  [facility] OB/ GYN           Reason for Visit     Routine Prenatal Visit           Diagnoses this Visit        Comments    Encounter for supervision of other normal pregnancy in third trimester    -  Primary     Diet controlled gestational diabetes mellitus (GDM) in third trimester                To Do List           Future Appointments        Provider Department Dept Phone    3/16/2017 12:00 PM [address] - Ultrasound [phone]    3/16/2017 2:45 PM Terrie Hawley MD [facility] OB/ -[phone]      Goals (5 Years of Data)     None      Follow-Up and Disposition     Return in about 1 week (around 3/17/2017).      [facility]     [facility] On Call Nurse [facility] Line -  Assistance  Registered nurses in the [facility] On Call Center provide clinical advisement, health education, appointment booking, and other advisory services.  Call for this free service at [phone].             Medications           Message regarding Medications     Verify the changes and/or additions to your medication regime listed below are the same as discussed with your clinician today.  If any of these changes or additions are incorrect, please notify your healthcare provider.             Verify that the below list of medications is an accurate representation of the medications you are currently taking.  If none reported, the list may be blank. If incorrect, please contact your healthcare provider. Carry this list with you in case of emergency.           Current Medications     PNV62/FA/OM3/DHA/EPA/FISH OIL (PRENATAL GUMMY ORAL) Take by mouth.           Clinical Reference Information           Prenatal Vitals     Enc. Date GA Prenatal Vitals Prenatal Pulse Pain Level Urine  Albumin/Glucose Edema Presentation Dilation/Effacement/Station    3/10/17 38w4d 118/76 / 95.7 kg (211 lb) 38 cm / 135 / Present 78 0 Negative / Negative None / None / None / No Vertex 0 / 70 / -3    3/2/17 37w3d 122/82 / 93 kg (205 lb) 37 cm / 138 / Present 76 0 Negative / Negative None / None / None / No Vertex 0 / 70 / -3    17 36w3d 120/78 / 92.1 kg (203 lb) 36 cm / 125 / Present 76 0 Negative / Negative None / None / None / No Vertex 0 / 50 / -4    17 35w3d 114/68 / 92.1 kg (203 lb) 35 cm / 161 (u/s) / Present 77 0 Negative / Negative None / None / None / No Vertex 0 / 30 / -4    17 34w2d 122/82 / 90.3 kg (199 lb) 34 cm / 138 / Present 78 0 Negative / Negative None / None / None / No      17 32w2d 118/67 / 88 kg (194 lb) 32 cm / 144 (u/s) / Present 76 0 Negative / Negative None / None / None / No Vertex     17 30w2d 112/76 / 88 kg (194 lb) 31 cm / 140 / Present 72 0 Negative / Negative None / None / None / No  0 / 0 / -5    1/3/17 29w1d 110/70 / 86.2 kg (190 lb) 29 cm / 137 / Present 78 0 Negative / Negative None / None / None / No      16 27w1d 110/78 / 85.7 kg (189 lb) 27.5 cm / 162 / Present 78 0 Negative / Negative None / None / None      16 24w1d 120/80 / 84.4 kg (186 lb)  / 148 / Present 82 0 Negative / Negative None / None / None      10/27/16 19w3d 110/70 / 80.7 kg (178 lb)  / 158 (u/s) / Present 85 0 Negative / Negative None / None / None      10/7/16 16w4d 104/69 / 80.3 kg (177 lb)  / 148 79 0 Negative / Negative None / None / None      9/6/16 12w1d 108/76 / 80.6 kg (177 lb 9.6 oz)  / 167 85 0 Negative / Negative None / None / None         TW.1 kg (31 lb)   Pregravid weight: 81.6 kg (180 lb)   Number of fetuses: 1       Your Vitals Were     BP Pulse Weight Last Period BMI    118/76 78 95.7 kg (211 lb) 2016 35.11 kg/m2      Allergies as of 3/10/2017     No Known Allergies      Immunizations Administered on Date of Encounter - 3/10/2017     None      Orders  Placed During Today's Visit     Future Labs/Procedures Expected by Expires    US OB/GYN Procedure (Viewpoint) - Extended List - Future  As directed 3/10/2018      Language Assistance Services     ATTENTION: Language assistance services are available, free of charge. Please call [phone].      ATENCIÓN: Si damien rea, tiene a montoya disposición servicios gratuitos de asistencia lingüística. Llame al [phone].     CHÚ Ý: N?u b?n nói Ti?ng Vi?t, có các d?ch v? h? tr? ngôn ng? mi?n phí dành cho b?n. G?i s? [phone].         [place] - OB/ GYN complies with applicable Federal civil rights laws and does not discriminate on the basis of race, color, national origin, age, disability, or sex.

## 2017-03-10 NOTE — PROGRESS NOTES
Pt reports she has experienced bleeding gums.  Denies nosebleeds and headaches.  Pt without complaints otherwise.  Feels occasional mild contractions.  No vaginal bleeding or leakage of fluid.  +FM.  Blood glucose levels have been normal. NSt today reactive.  Will do BPP and growth ultrasound in conjunction with next visit.  Labor precautions.  Fetal kick counts.

## 2017-03-16 ENCOUNTER — PROCEDURE VISIT (OUTPATIENT)
Dept: OBSTETRICS AND GYNECOLOGY | Facility: CLINIC | Age: 22
End: 2017-03-16
Payer: MEDICAID

## 2017-03-16 ENCOUNTER — ROUTINE PRENATAL (OUTPATIENT)
Dept: OBSTETRICS AND GYNECOLOGY | Facility: CLINIC | Age: 22
End: 2017-03-16
Payer: MEDICAID

## 2017-03-16 VITALS
HEART RATE: 74 BPM | DIASTOLIC BLOOD PRESSURE: 74 MMHG | SYSTOLIC BLOOD PRESSURE: 118 MMHG | WEIGHT: 208 LBS | BODY MASS INDEX: 34.61 KG/M2

## 2017-03-16 DIAGNOSIS — O24.410 DIET CONTROLLED GESTATIONAL DIABETES MELLITUS (GDM) IN THIRD TRIMESTER: ICD-10-CM

## 2017-03-16 DIAGNOSIS — Z34.83 ENCOUNTER FOR SUPERVISION OF OTHER NORMAL PREGNANCY IN THIRD TRIMESTER: ICD-10-CM

## 2017-03-16 DIAGNOSIS — Z34.83 ENCOUNTER FOR SUPERVISION OF OTHER NORMAL PREGNANCY IN THIRD TRIMESTER: Primary | ICD-10-CM

## 2017-03-16 PROCEDURE — 76818 FETAL BIOPHYS PROFILE W/NST: CPT | Mod: 26,S$PBB,, | Performed by: OBSTETRICS & GYNECOLOGY

## 2017-03-16 PROCEDURE — 76818 FETAL BIOPHYS PROFILE W/NST: CPT | Mod: PBBFAC | Performed by: OBSTETRICS & GYNECOLOGY

## 2017-03-16 PROCEDURE — 99213 OFFICE O/P EST LOW 20 MIN: CPT | Mod: TH,S$PBB,, | Performed by: OBSTETRICS & GYNECOLOGY

## 2017-03-16 PROCEDURE — 99999 PR PBB SHADOW E&M-EST. PATIENT-LVL II: CPT | Mod: PBBFAC,,, | Performed by: OBSTETRICS & GYNECOLOGY

## 2017-03-16 RX ORDER — SODIUM CHLORIDE, SODIUM LACTATE, POTASSIUM CHLORIDE, CALCIUM CHLORIDE 600; 310; 30; 20 MG/100ML; MG/100ML; MG/100ML; MG/100ML
INJECTION, SOLUTION INTRAVENOUS CONTINUOUS
Status: CANCELLED | OUTPATIENT
Start: 2017-03-16

## 2017-03-16 RX ORDER — BUTORPHANOL TARTRATE 1 MG/ML
1 INJECTION INTRAMUSCULAR; INTRAVENOUS
Status: CANCELLED | OUTPATIENT
Start: 2017-03-16

## 2017-03-16 RX ORDER — ONDANSETRON 2 MG/ML
4 INJECTION INTRAMUSCULAR; INTRAVENOUS EVERY 6 HOURS PRN
Status: CANCELLED | OUTPATIENT
Start: 2017-03-16

## 2017-03-16 RX ORDER — OXYTOCIN/RINGER'S LACTATE 20/1000 ML
2 PLASTIC BAG, INJECTION (ML) INTRAVENOUS CONTINUOUS
Status: CANCELLED | OUTPATIENT
Start: 2017-03-22

## 2017-03-16 NOTE — H&P
History and Physical  Obstetrics      SUBJECTIVE:     Nolvia Ray is a 21 y.o.  female  at 39w3d weeks gestation with an Estimated Date of Delivery: 3/20/17 who presents for scheduled induction of labor (40 1/7 weeks at time of scheduled induction).  She has had irregular contractions.  Denies vaginal bleeding and leakage of fluid.  Reports good FM.     She has been followed prenatally with the following prenatal complications:   Patient Active Problem List   Diagnosis    MLA (generalized anxiety disorder)    Social anxiety disorder    Excessive caffeine intake    Encounter for supervision of normal pregnancy in third trimester    Abnormal glucose tolerance test (GTT) during pregnancy, antepartum    Diet controlled gestational diabetes mellitus (GDM) in third trimester         HISTORY:     Prior to Admission medications    Medication Sig Start Date End Date Taking? Authorizing Provider   PNV62/FA/OM3/DHA/EPA/FISH OIL (PRENATAL GUMMY ORAL) Take by mouth.    Historical Provider, MD      Past Medical History:   Diagnosis Date    Anxiety     Miscarriage     Psychiatric problem     anxiety    Therapy      History reviewed. No pertinent surgical history.  Family History   Problem Relation Age of Onset    Hypertension Mother     Hypertension Father     Cancer Paternal Grandmother      lung    Cancer Paternal Grandfather      leukemia    Breast cancer Neg Hx     Colon cancer Neg Hx     Ovarian cancer Neg Hx     Bipolar disorder Neg Hx     Schizophrenia Neg Hx     Suicide Neg Hx      Social History   Substance Use Topics    Smoking status: Former Smoker    Smokeless tobacco: Former User     Quit date: 2016    Alcohol use No     OB History    Para Term  AB SAB TAB Ectopic Multiple Living   2    1 1    0      # Outcome Date GA Lbr Dorian/2nd Weight Sex Delivery Anes PTL Lv   2 Current            1 SAB                   Allergy:   Review of patient's allergies  indicates:  No Known Allergies       Review of Systems:  Negative       OBJECTIVE:   /74  Pulse 74  Wt 94.3 kg (208 lb)  LMP 06/13/2016  BMI 34.61 kg/m2        Physical Exam:  General:  alert,normal appearing gravid female   Skin:  Skin color, texture, turgor normal. No rashes or lesions   HEENT:  conjunctivae/corneas clear. JENIFER   Lungs:  clear to auscultation bilaterally   Heart:  regular rate and rhythm, S1, S2 normal, no murmur, click, rub or gallop   Breasts:   Nipples are protruding and have no nipple discharge. No palpable masses, erythema, skin changes, tenderness, or adenopathy.   Abdomen:  soft, non-tender; bowel sounds normal   Uterine Size:  consistent with dates   Presentations:  cephalic   FHT: 143 BPM   Pelvis: External genitalia: normal external genitalia without lesions  Vaginal: without lesions or discharge   Cervix:     Dilation: 1 cm     Effacement: 70%    Station:  -3    Consistency: medium    Position: posterior       OB Lab History:     Group & Rh   Date Value Ref Range Status   08/09/2016 O POS  Final     Indirect Mabel   Date Value Ref Range Status   08/09/2016 NEG  Final     Lab Results   Component Value Date    WBC 9.13 12/20/2016    HGB 11.3 (L) 12/20/2016    HCT 33.8 (L) 12/20/2016    MCV 85 12/20/2016     12/20/2016     Lab Results   Component Value Date    TSH 3.492 12/20/2016     Lab Results   Component Value Date    RUBELLAIGGAN <5.0 (A) 08/09/2016     Lab Results   Component Value Date    RPR Non-reactive 08/09/2016     HIV 1/2 Ag/Ab   Date Value Ref Range Status   08/09/2016 Negative Negative Final     Hepatitis B Surface Ag   Date Value Ref Range Status   08/09/2016 Negative  Final     Results for orders placed or performed in visit on 12/20/16   OB Glucose Screen   Result Value Ref Range    OB Glucose Screen 154 (H) 70 - 140 mg/dL     Results for orders placed or performed in visit on 02/16/17   Strep B Screen, Vaginal / Rectal   Result Value Ref Range    Strep  B Culture No Group B Streptococcus isolated      No results found for this or any previous visit.  Results for orders placed or performed in visit on 08/09/16   C. trachomatis/N. gonorrhoeae by AMP DNA Cervix   Result Value Ref Range    Chlamydia, Amplified DNA Negative     N gonorrhoeae, amplified DNA Negative        ASSESSMENT/PLAN:     IUP 39w3d gestation (40 1/7 weeks at time of scheduled induction)  Gestational diabetes (diet controlled)    Patient Active Problem List   Diagnosis    MAL (generalized anxiety disorder)    Social anxiety disorder    Excessive caffeine intake    Encounter for supervision of normal pregnancy in third trimester    Abnormal glucose tolerance test (GTT) during pregnancy, antepartum    Diet controlled gestational diabetes mellitus (GDM) in third trimester         PLAN:   1.  Admit to Labor and Delivery.  2.  Will induce via cervidil followed by pitocin and amniotomy.

## 2017-03-16 NOTE — MR AVS SNAPSHOT
[place] - OB/ GYN  [address] [place] [address]  Phone: [phone]                  Nolvia Ray   3/16/2017 2:45 PM   Routine Prenatal    Description:  Female : 1995   Provider:  Terrie Hawley MD   Department:  [place] - OB/ GYN           Reason for Visit     Routine Prenatal Visit           Diagnoses this Visit        Comments    Encounter for supervision of other normal pregnancy in third trimester    -  Primary     Diet controlled gestational diabetes mellitus (GDM) in third trimester                To Do List           Future Appointments        Provider Department Dept Phone    3/16/2017 2:45 PM Terrie Hawley MD [place] - OB/ -[phone]    3/21/2017 6:00 PM INDUCTIONS, [facility] [phone]      Goals (5 Years of Data)     None      Follow-Up and Disposition     Return for scheduled induction of labor.      [facility]     [facility] Nurse Care Line -  Assistance  Registered nurses in the [facility] Center provide clinical advisement, health education, appointment booking, and other advisory services.  Call for this free service at [phone].             Medications           Message regarding Medications     Verify the changes and/or additions to your medication regime listed below are the same as discussed with your clinician today.  If any of these changes or additions are incorrect, please notify your healthcare provider.             Verify that the below list of medications is an accurate representation of the medications you are currently taking.  If none reported, the list may be blank. If incorrect, please contact your healthcare provider. Carry this list with you in case of emergency.           Current Medications     PNV62/FA/OM3/DHA/EPA/FISH OIL (PRENATAL GUMMY ORAL) Take by mouth.           Clinical Reference Information           Prenatal Vitals     Enc. Date GA Prenatal Vitals Prenatal Pulse  Pain Level Urine Albumin/Glucose Edema Presentation Dilation/Effacement/Station    3/16/17 39w3d 118/74 / 94.3 kg (208 lb) 39 cm / 143 (u/s) / Present 74 0 Negative / Negative None / None / None / No Vertex 1  70 / -3    3/10/17 38w4d 118/76 / 95.7 kg (211 lb) 38 cm / 135 / Present 78 0 Negative / Negative None / None / None / No Vertex 0  70 / -3    3/2/17 37w3d 122/82 / 93 kg (205 lb) 37 cm / 138 / Present 76 0 Negative / Negative None / None / None / No Vertex 0  70 / -3    17 36w3d 120/78 / 92.1 kg (203 lb) 36 cm / 125 / Present 76 0 Negative / Negative None / None / None / No Vertex 0 / 50 / -4    17 35w3d 114/68 / 92.1 kg (203 lb) 35 cm / 161 (u/s) / Present 77 0 Negative / Negative None / None / None / No Vertex  30 / -4    17 34w2d 122/82 / 90.3 kg (199 lb) 34 cm / 138 / Present 78 0 Negative / Negative None / None / None / No      17 32w2d 118/67 / 88 kg (194 lb) 32 cm / 144 (u/s) / Present 76 0 Negative / Negative None / None / None / No Vertex     17 30w2d 112/76 / 88 kg (194 lb) 31 cm / 140 / Present 72 0 Negative / Negative None / None / None / No  0 / 0 / -5    1/3/17 29w1d 110/70 / 86.2 kg (190 lb) 29 cm / 137 / Present 78 0 Negative / Negative None / None / None / No      16 27w1d 110/78 / 85.7 kg (189 lb) 27.5 cm / 162 / Present 78 0 Negative / Negative None / None / None      16 24w1d 120/80 / 84.4 kg (186 lb)  / 148 / Present 82 0 Negative / Negative None / None / None      10/27/16 19w3d 110/70 / 80.7 kg (178 lb)  / 158 (u/s) / Present 85 0 Negative / Negative None / None / None      10/7/16 16w4d 104/69 / 80.3 kg (177 lb)  / 148 79 0 Negative / Negative None / None / None      16 12w1d 108/76 / 80.6 kg (177 lb 9.6 oz)  / 167 85 0 Negative / Negative None / None / None         TW.7 kg (28 lb)   Pregravid weight: 81.6 kg (180 lb)   Number of fetuses: 1       Your Vitals Were     BP Pulse Weight Last Period BMI    118/74 74 94.3 kg (208 lb)  06/13/2016 34.61 kg/m2      Allergies as of 3/16/2017     No Known Allergies      Immunizations Administered on Date of Encounter - 3/16/2017     None      Language Assistance Services     ATTENTION: Language assistance services are available, free of charge. Please call [phone].      ATENCIÓN: Si habla rona, tiene a montoya disposición servicios gratuitos de asistencia lingüística. Llame al [phone].     CHÚ Ý: N?u b?n nói Ti?ng Vi?t, có các d?ch v? h? tr? ngôn ng? mi?n phí dành cho b?n. G?i s? [phone].         [place] - OB/ GYN complies with applicable Federal civil rights laws and does not discriminate on the basis of race, color, national origin, age, disability, or sex.

## 2017-03-16 NOTE — PROGRESS NOTES
Pt without complaints.  Has noted some irregular contractions.  No vaginal bleeding or leakage of fluid.  +FM.  Has had normal blood glucose levels. Pt to continue diabetic diet.  Labor precautions given.  Fetal kick counts emphasized.  Reviewed today's ultrasound findings.  BPP 8/8.  Will induce via cervidil followed by pitocin and amniotomy if patient undelivered by next week.

## 2017-03-19 ENCOUNTER — HOSPITAL ENCOUNTER (INPATIENT)
Facility: HOSPITAL | Age: 22
LOS: 4 days | Discharge: HOME | End: 2017-03-23
Attending: OBSTETRICS & GYNECOLOGY | Admitting: OBSTETRICS & GYNECOLOGY
Payer: MEDICAID

## 2017-03-19 DIAGNOSIS — Z98.891 POSTCESAREAN SECTION: ICD-10-CM

## 2017-03-19 DIAGNOSIS — O16.3 ELEVATED BLOOD PRESSURE AFFECTING PREGNANCY IN THIRD TRIMESTER, ANTEPARTUM: Primary | ICD-10-CM

## 2017-03-19 DIAGNOSIS — O24.410 DIET CONTROLLED GESTATIONAL DIABETES MELLITUS (GDM) IN THIRD TRIMESTER: ICD-10-CM

## 2017-03-19 DIAGNOSIS — O47.9 THREATENED LABOR AT TERM: ICD-10-CM

## 2017-03-19 DIAGNOSIS — O14.93 PRE-ECLAMPSIA IN THIRD TRIMESTER: ICD-10-CM

## 2017-03-19 LAB
ABO + RH BLD: NORMAL
ALBUMIN SERPL BCP-MCNC: 2.8 G/DL
ALP SERPL-CCNC: 247 U/L
ALT SERPL W/O P-5'-P-CCNC: 13 U/L
ANION GAP SERPL CALC-SCNC: 11 MMOL/L
AST SERPL-CCNC: 14 U/L
BACTERIA #/AREA URNS HPF: ABNORMAL /HPF
BASOPHILS # BLD AUTO: 0.04 K/UL
BASOPHILS NFR BLD: 0.4 %
BILIRUB SERPL-MCNC: 0.2 MG/DL
BILIRUB UR QL STRIP: NEGATIVE
BLD GP AB SCN CELLS X3 SERPL QL: NORMAL
BUN SERPL-MCNC: 12 MG/DL
CALCIUM SERPL-MCNC: 9.1 MG/DL
CHLORIDE SERPL-SCNC: 105 MMOL/L
CLARITY UR: ABNORMAL
CO2 SERPL-SCNC: 22 MMOL/L
COLOR UR: YELLOW
CREAT SERPL-MCNC: 0.8 MG/DL
CREAT UR-MCNC: 97.8 MG/DL
DIFFERENTIAL METHOD: ABNORMAL
EOSINOPHIL # BLD AUTO: 0.1 K/UL
EOSINOPHIL NFR BLD: 1.3 %
ERYTHROCYTE [DISTWIDTH] IN BLOOD BY AUTOMATED COUNT: 17.1 %
EST. GFR  (AFRICAN AMERICAN): >60 ML/MIN/1.73 M^2
EST. GFR  (NON AFRICAN AMERICAN): >60 ML/MIN/1.73 M^2
GLUCOSE SERPL-MCNC: 100 MG/DL
GLUCOSE UR QL STRIP: NEGATIVE
HCT VFR BLD AUTO: 33.3 %
HGB BLD-MCNC: 10.9 G/DL
HGB UR QL STRIP: ABNORMAL
HYALINE CASTS #/AREA URNS LPF: 0 /LPF
KETONES UR QL STRIP: NEGATIVE
LDH SERPL L TO P-CCNC: 178 U/L
LEUKOCYTE ESTERASE UR QL STRIP: NEGATIVE
LYMPHOCYTES # BLD AUTO: 3 K/UL
LYMPHOCYTES NFR BLD: 30.7 %
MCH RBC QN AUTO: 26.1 PG
MCHC RBC AUTO-ENTMCNC: 32.7 %
MCV RBC AUTO: 80 FL
MICROSCOPIC COMMENT: ABNORMAL
MONOCYTES # BLD AUTO: 1 K/UL
MONOCYTES NFR BLD: 9.9 %
NEUTROPHILS # BLD AUTO: 5.6 K/UL
NEUTROPHILS NFR BLD: 57.7 %
NITRITE UR QL STRIP: NEGATIVE
OTHER ELEMENTS URNS MICRO: ABNORMAL
PH UR STRIP: 7 [PH] (ref 5–8)
PLATELET # BLD AUTO: 269 K/UL
PMV BLD AUTO: 11.3 FL
POCT GLUCOSE: 88 MG/DL (ref 70–110)
POTASSIUM SERPL-SCNC: 4.6 MMOL/L
PROT SERPL-MCNC: 6.4 G/DL
PROT UR QL STRIP: ABNORMAL
PROT UR-MCNC: 133 MG/DL
PROT/CREAT RATIO, UR: 1.36
RBC # BLD AUTO: 4.18 M/UL
RBC #/AREA URNS HPF: 6 /HPF (ref 0–4)
SODIUM SERPL-SCNC: 138 MMOL/L
SP GR UR STRIP: 1.02 (ref 1–1.03)
SQUAMOUS #/AREA URNS HPF: 30 /HPF
URATE SERPL-MCNC: 9.5 MG/DL
URN SPEC COLLECT METH UR: ABNORMAL
UROBILINOGEN UR STRIP-ACNC: NEGATIVE EU/DL
WBC # BLD AUTO: 9.77 K/UL
WBC #/AREA URNS HPF: 11 /HPF (ref 0–5)
YEAST URNS QL MICRO: ABNORMAL

## 2017-03-19 PROCEDURE — 11000001 HC ACUTE MED/SURG PRIVATE ROOM

## 2017-03-19 PROCEDURE — 84550 ASSAY OF BLOOD/URIC ACID: CPT

## 2017-03-19 PROCEDURE — 99211 OFF/OP EST MAY X REQ PHY/QHP: CPT

## 2017-03-19 PROCEDURE — 27200120 HC KIT IV START (RUSH ONLY)

## 2017-03-19 PROCEDURE — 72100002 HC LABOR CARE, 1ST 8 HOURS

## 2017-03-19 PROCEDURE — 83615 LACTATE (LD) (LDH) ENZYME: CPT

## 2017-03-19 PROCEDURE — 27000339 *HC DAILY SUPPLY KIT

## 2017-03-19 PROCEDURE — 25000003 PHARM REV CODE 250: Performed by: OBSTETRICS & GYNECOLOGY

## 2017-03-19 PROCEDURE — 86900 BLOOD TYPING SEROLOGIC ABO: CPT

## 2017-03-19 PROCEDURE — 86901 BLOOD TYPING SEROLOGIC RH(D): CPT

## 2017-03-19 PROCEDURE — 84156 ASSAY OF PROTEIN URINE: CPT

## 2017-03-19 PROCEDURE — 85025 COMPLETE CBC W/AUTO DIFF WBC: CPT

## 2017-03-19 PROCEDURE — 80053 COMPREHEN METABOLIC PANEL: CPT

## 2017-03-19 PROCEDURE — 81000 URINALYSIS NONAUTO W/SCOPE: CPT

## 2017-03-19 PROCEDURE — 36415 COLL VENOUS BLD VENIPUNCTURE: CPT

## 2017-03-19 PROCEDURE — 3E033VJ INTRODUCTION OF OTHER HORMONE INTO PERIPHERAL VEIN, PERCUTANEOUS APPROACH: ICD-10-PCS | Performed by: OBSTETRICS & GYNECOLOGY

## 2017-03-19 PROCEDURE — 86592 SYPHILIS TEST NON-TREP QUAL: CPT

## 2017-03-19 PROCEDURE — 63600175 PHARM REV CODE 636 W HCPCS: Performed by: OBSTETRICS & GYNECOLOGY

## 2017-03-19 RX ORDER — LABETALOL HCL 20 MG/4 ML
20 SYRINGE (ML) INTRAVENOUS ONCE
Status: COMPLETED | OUTPATIENT
Start: 2017-03-19 | End: 2017-03-19

## 2017-03-19 RX ORDER — OXYTOCIN/RINGER'S LACTATE 20/1000 ML
2 PLASTIC BAG, INJECTION (ML) INTRAVENOUS CONTINUOUS
Status: DISCONTINUED | OUTPATIENT
Start: 2017-03-19 | End: 2017-03-20

## 2017-03-19 RX ORDER — SODIUM CHLORIDE, SODIUM LACTATE, POTASSIUM CHLORIDE, CALCIUM CHLORIDE 600; 310; 30; 20 MG/100ML; MG/100ML; MG/100ML; MG/100ML
INJECTION, SOLUTION INTRAVENOUS CONTINUOUS
Status: DISCONTINUED | OUTPATIENT
Start: 2017-03-19 | End: 2017-03-20 | Stop reason: SDUPTHER

## 2017-03-19 RX ORDER — ONDANSETRON 8 MG/1
8 TABLET, ORALLY DISINTEGRATING ORAL EVERY 8 HOURS PRN
Status: DISCONTINUED | OUTPATIENT
Start: 2017-03-19 | End: 2017-03-20

## 2017-03-19 RX ORDER — BUTORPHANOL TARTRATE 2 MG/ML
1 INJECTION INTRAMUSCULAR; INTRAVENOUS EVERY 4 HOURS PRN
Status: DISCONTINUED | OUTPATIENT
Start: 2017-03-19 | End: 2017-03-20

## 2017-03-19 RX ORDER — ACETAMINOPHEN 500 MG
500 TABLET ORAL EVERY 6 HOURS PRN
Status: DISCONTINUED | OUTPATIENT
Start: 2017-03-19 | End: 2017-03-20

## 2017-03-19 RX ORDER — SODIUM CHLORIDE, SODIUM LACTATE, POTASSIUM CHLORIDE, CALCIUM CHLORIDE 600; 310; 30; 20 MG/100ML; MG/100ML; MG/100ML; MG/100ML
INJECTION, SOLUTION INTRAVENOUS CONTINUOUS
Status: DISCONTINUED | OUTPATIENT
Start: 2017-03-19 | End: 2017-03-20

## 2017-03-19 RX ADMIN — PROMETHAZINE HYDROCHLORIDE 12.5 MG: 25 INJECTION INTRAMUSCULAR; INTRAVENOUS at 09:03

## 2017-03-19 RX ADMIN — Medication 2 MILLI-UNITS/MIN: at 07:03

## 2017-03-19 RX ADMIN — BUTORPHANOL TARTRATE 1 MG: 2 INJECTION, SOLUTION INTRAMUSCULAR; INTRAVENOUS at 09:03

## 2017-03-19 RX ADMIN — LABETALOL HYDROCHLORIDE 20 MG: 5 INJECTION, SOLUTION INTRAVENOUS at 09:03

## 2017-03-19 RX ADMIN — SODIUM CHLORIDE, SODIUM LACTATE, POTASSIUM CHLORIDE, AND CALCIUM CHLORIDE: .6; .31; .03; .02 INJECTION, SOLUTION INTRAVENOUS at 05:03

## 2017-03-19 NOTE — PROGRESS NOTES
Pt with elevated BP. Pt denies any signs of increased BP. No HA, dizziness, blurred/spotty vision.

## 2017-03-19 NOTE — PROGRESS NOTES
PT. RECEIVED FROM ER W/ C/O CTX Q3 MIN. X 1 HR & +FM. ASSISTED TO . INSTRUCTIONS GIVEN ON GOWNING & URINE COLLECTION. PT. VERBALIZED UNDERSTANDING. ASSISTED TO BED & MONITORS ATTACHED BY IRMA BURGOS RN.

## 2017-03-19 NOTE — IP AVS SNAPSHOT
[address] [address]  Phone: [phone]           Patient Discharge Instructions     Our goal is to set you up for success. This packet includes information on your condition, medications, and your home care. It will help you to care for yourself so you don't get sicker and need to go back to the hospital.     Please ask your nurse if you have any questions.        There are many details to remember when preparing to leave the hospital. Here is what you will need to do:    1. Take your medicine. If you are prescribed medications, review your Medication List in the following pages. You may have new medications to  at the pharmacy and others that you'll need to stop taking. Review the instructions for how and when to take your medications. Talk with your doctor or nurses if you are unsure of what to do.     2. Go to your follow-up appointments. Specific follow-up information is listed in the following pages. Your may be contacted by a transition nurse or clinical provider about future appointments. Be sure we have all of the phone numbers to reach you, if needed. Please contact your provider's office if you are unable to make an appointment.     3. Watch for warning signs. Your doctor or nurse will give you detailed warning signs to watch for and when to call for assistance. These instructions may also include educational information about your condition. If you experience any of warning signs to your health, call your doctor.               [facility]  Unless otherwise directed by your provider, please contact [facility], our nurse care line that is available for 24/7 assistance.     [phone] (toll-free)    Registered nurses in the [facility] provide clinical advisement, health education, appointment booking, and other advisory services.                    ** Verify the list of medication(s) below is accurate and up to date. Carry  this with you in case of emergency. If your medications have changed, please notify your healthcare provider.             Medication List      ASK your doctor about these medications        Additional Info                      PRENATAL GUMMY ORAL   Refills:  0    Instructions:  Take by mouth.     Begin Date    AM    Noon    PM    Bedtime                  Please bring to all follow up appointments:    1. A copy of your discharge instructions.  2. All medicines you are currently taking in their original bottles.  3. Identification and insurance card.    Please arrive 15 minutes ahead of scheduled appointment time.    Please call 24 hours in advance if you must reschedule your appointment and/or time.        Your Scheduled Appointments     Apr 04, 2017  1:00 PM CDT   Post Partum with Terrie Hawley MD   [facility] ([facility])    [address] [facility] [address] [phone]                  Discharge Instructions       Postpartum Discharge Instructions:    · No heavy lifting, straining, frequent rest periods  · Pelvic rest--no douching, tampons, or intercourse until released by MD  · Talk to your doctor about birth control--remember breastfeeding is not a method of birth control  · Notify MD if bleeding becomes heavier than usual and if large clots, painful cramping,or foul odor develops.   Vaginal discharge will lighten and decrease in amount gradually.    · Cleanse perineal area from front to back after urination or having a bowel movement.  · Tub soak or portable sitz bath at home.  Apply clean pad with Epifoam and Tucks to perineal area.  · Episiotomy stitches will dissolve within 2-3 weeks  · If not breastfeeding, wear tight fitting sports bra for 1 week--remove only to bathe  · Remember to keep your breast clean and dry to prevent any cracking  · Notify MD if breast become reddened,swollen, nipples bleed or crack, or fever greater than 100.4  · Notify MD of pain, swelling,  Redness, or  "heat developing in back of leg especially when foot is flexed toward body  · Look at incision everyday for redness, swelling, or drainage which may indicate infection  · Notify MD of pain not relieved by pain medication.  · Call MD or go to ER for any concerns      Primary Diagnosis     Your primary diagnosis was:  High Blood Pressure And Swelling During Pregnancy      Admission Information     Date & Time Provider Department CSN    3/19/2017  4:13 PM Terrie Hawley MD [facility] [id number]      Care Providers     Provider Role Specialty Primary office phone    Terrie Hawley MD Attending Provider Obstetrics [phone]    Terrie Hawley MD Surgeon  Obstetrics [phone]      Your Vitals Were     BP Pulse Temp Resp Height Weight    128/74 97 97.3 °F (36.3 °C) (Oral) 16 5' 6" (1.676 m) 94.3 kg (208 lb)    Last Period SpO2 BMI          06/13/2016 96% 33.57 kg/m2        Recent Lab Values     No lab values to display.      Allergies as of 3/23/2017     No Known Allergies      Advance Directives     An advance directive is a document which, in the event you are no longer able to make decisions for yourself, tells your healthcare team what kind of treatment you do or do not want to receive, or who you would like to make those decisions for you.  If you do not currently have an advance directive, [facility] encourages you to create one.  For more information call:  [phone]), [phone] ([phone]),  or log on to [email or web address].        Smoking Cessation     If you would like to quit smoking:   You may be eligible for free services if you are a [place] resident and started smoking cigarettes before September 1, 1988.  Call the Smoking Cessation Trust (SCT) toll free at (963) 101-8332 or (084) 675-5429.   Call 6-800-QUIT-NOW if you do not meet the above criteria.            Language Assistance Services     ATTENTION: Language assistance services are available, free of " charge. Please call [phone].      ATENCIÓN: Si habla español, tiene a montoya disposición servicios gratuitos de asistencia lingüística. Llame al [phone].     CHÚ Ý: N?u b?n nói Ti?ng Vi?t, có các d?ch v? h? tr? ngôn ng? mi?n phí dành cho b?n. G?i s? [phone].        Diabetes Discharge Instructions                                    [facility] complies with applicable Federal civil rights laws and does not discriminate on the basis of race, color, national origin, age, disability, or sex.

## 2017-03-20 ENCOUNTER — SURGERY (OUTPATIENT)
Age: 22
End: 2017-03-20

## 2017-03-20 ENCOUNTER — ANESTHESIA EVENT (OUTPATIENT)
Dept: SURGERY | Facility: HOSPITAL | Age: 22
End: 2017-03-20
Payer: MEDICAID

## 2017-03-20 ENCOUNTER — ANESTHESIA (OUTPATIENT)
Dept: SURGERY | Facility: HOSPITAL | Age: 22
End: 2017-03-20
Payer: MEDICAID

## 2017-03-20 LAB
POCT GLUCOSE: 72 MG/DL (ref 70–110)
POCT GLUCOSE: 80 MG/DL (ref 70–110)
POCT GLUCOSE: 92 MG/DL (ref 70–110)
POCT GLUCOSE: 94 MG/DL (ref 70–110)
POCT GLUCOSE: 95 MG/DL (ref 70–110)
RPR SER QL: NORMAL

## 2017-03-20 PROCEDURE — 37000008 HC ANESTHESIA 1ST 15 MINUTES: Performed by: OBSTETRICS & GYNECOLOGY

## 2017-03-20 PROCEDURE — 36000709 HC OR TIME LEV III EA ADD 15 MIN: Performed by: OBSTETRICS & GYNECOLOGY

## 2017-03-20 PROCEDURE — 63600175 PHARM REV CODE 636 W HCPCS: Performed by: OBSTETRICS & GYNECOLOGY

## 2017-03-20 PROCEDURE — 27000339 *HC DAILY SUPPLY KIT

## 2017-03-20 PROCEDURE — 37000009 HC ANESTHESIA EA ADD 15 MINS: Performed by: OBSTETRICS & GYNECOLOGY

## 2017-03-20 PROCEDURE — 11000001 HC ACUTE MED/SURG PRIVATE ROOM

## 2017-03-20 PROCEDURE — 25000003 PHARM REV CODE 250: Performed by: NURSE ANESTHETIST, CERTIFIED REGISTERED

## 2017-03-20 PROCEDURE — 27000494 *HC OXYGEN SET UP (STAH ONLY)

## 2017-03-20 PROCEDURE — 63600175 PHARM REV CODE 636 W HCPCS: Performed by: NURSE ANESTHETIST, CERTIFIED REGISTERED

## 2017-03-20 PROCEDURE — 59514 CESAREAN DELIVERY ONLY: CPT | Mod: GB,,, | Performed by: OBSTETRICS & GYNECOLOGY

## 2017-03-20 PROCEDURE — 82962 GLUCOSE BLOOD TEST: CPT

## 2017-03-20 PROCEDURE — 25000003 PHARM REV CODE 250: Performed by: OBSTETRICS & GYNECOLOGY

## 2017-03-20 PROCEDURE — 62326 NJX INTERLAMINAR LMBR/SAC: CPT | Performed by: NURSE ANESTHETIST, CERTIFIED REGISTERED

## 2017-03-20 PROCEDURE — 01968 ANES/ANALG CS DLVR NEURAXIAL: CPT | Mod: P2,,QZ | Performed by: NURSE ANESTHETIST, CERTIFIED REGISTERED

## 2017-03-20 PROCEDURE — 10907ZC DRAINAGE OF AMNIOTIC FLUID, THERAPEUTIC FROM PRODUCTS OF CONCEPTION, VIA NATURAL OR ARTIFICIAL OPENING: ICD-10-PCS | Performed by: OBSTETRICS & GYNECOLOGY

## 2017-03-20 PROCEDURE — 71000033 HC RECOVERY, INTIAL HOUR: Performed by: OBSTETRICS & GYNECOLOGY

## 2017-03-20 PROCEDURE — 72100003 HC LABOR CARE, EA. ADDL. 8 HRS

## 2017-03-20 PROCEDURE — 51701 INSERT BLADDER CATHETER: CPT

## 2017-03-20 PROCEDURE — 36000708 HC OR TIME LEV III 1ST 15 MIN: Performed by: OBSTETRICS & GYNECOLOGY

## 2017-03-20 PROCEDURE — 27000494 *HC OXYGEN SET UP (STAH ONLY): Performed by: NURSE ANESTHETIST, CERTIFIED REGISTERED

## 2017-03-20 RX ORDER — KETOROLAC TROMETHAMINE 30 MG/ML
30 INJECTION, SOLUTION INTRAMUSCULAR; INTRAVENOUS EVERY 6 HOURS
Status: COMPLETED | OUTPATIENT
Start: 2017-03-20 | End: 2017-03-21

## 2017-03-20 RX ORDER — OXYCODONE AND ACETAMINOPHEN 5; 325 MG/1; MG/1
1 TABLET ORAL EVERY 4 HOURS PRN
Status: DISCONTINUED | OUTPATIENT
Start: 2017-03-20 | End: 2017-03-23 | Stop reason: HOSPADM

## 2017-03-20 RX ORDER — SODIUM CHLORIDE, SODIUM LACTATE, POTASSIUM CHLORIDE, CALCIUM CHLORIDE 600; 310; 30; 20 MG/100ML; MG/100ML; MG/100ML; MG/100ML
INJECTION, SOLUTION INTRAVENOUS CONTINUOUS
Status: ACTIVE | OUTPATIENT
Start: 2017-03-20 | End: 2017-03-21

## 2017-03-20 RX ORDER — DIPHENHYDRAMINE HCL 25 MG
25 CAPSULE ORAL EVERY 4 HOURS PRN
Status: DISCONTINUED | OUTPATIENT
Start: 2017-03-20 | End: 2017-03-23 | Stop reason: HOSPADM

## 2017-03-20 RX ORDER — DOCUSATE SODIUM 100 MG/1
200 CAPSULE, LIQUID FILLED ORAL 2 TIMES DAILY
Status: DISCONTINUED | OUTPATIENT
Start: 2017-03-20 | End: 2017-03-23 | Stop reason: HOSPADM

## 2017-03-20 RX ORDER — ROPIVACAINE HYDROCHLORIDE 2 MG/ML
INJECTION, SOLUTION EPIDURAL; INFILTRATION; PERINEURAL
Status: DISCONTINUED | OUTPATIENT
Start: 2017-03-20 | End: 2017-03-20

## 2017-03-20 RX ORDER — CEFAZOLIN SODIUM 1 G/50ML
1 SOLUTION INTRAVENOUS
Status: COMPLETED | OUTPATIENT
Start: 2017-03-20 | End: 2017-03-21

## 2017-03-20 RX ORDER — IBUPROFEN 800 MG/1
800 TABLET ORAL EVERY 8 HOURS
Status: DISCONTINUED | OUTPATIENT
Start: 2017-03-21 | End: 2017-03-23 | Stop reason: HOSPADM

## 2017-03-20 RX ORDER — ADHESIVE BANDAGE
30 BANDAGE TOPICAL 2 TIMES DAILY PRN
Status: DISCONTINUED | OUTPATIENT
Start: 2017-03-21 | End: 2017-03-23 | Stop reason: HOSPADM

## 2017-03-20 RX ORDER — ROPIVACAINE HYDROCHLORIDE 5 MG/ML
INJECTION, SOLUTION EPIDURAL; INFILTRATION; PERINEURAL
Status: DISCONTINUED | OUTPATIENT
Start: 2017-03-20 | End: 2017-03-20

## 2017-03-20 RX ORDER — LIDOCAINE HCL/EPINEPHRINE/PF 2%-1:200K
VIAL (ML) INJECTION
Status: DISCONTINUED | OUTPATIENT
Start: 2017-03-20 | End: 2017-03-20

## 2017-03-20 RX ORDER — AMOXICILLIN 250 MG
1 CAPSULE ORAL NIGHTLY PRN
Status: DISCONTINUED | OUTPATIENT
Start: 2017-03-20 | End: 2017-03-23 | Stop reason: HOSPADM

## 2017-03-20 RX ORDER — SIMETHICONE 80 MG
1 TABLET,CHEWABLE ORAL EVERY 6 HOURS PRN
Status: DISCONTINUED | OUTPATIENT
Start: 2017-03-20 | End: 2017-03-23 | Stop reason: HOSPADM

## 2017-03-20 RX ORDER — FENTANYL CITRATE 50 UG/ML
INJECTION, SOLUTION INTRAMUSCULAR; INTRAVENOUS
Status: DISCONTINUED | OUTPATIENT
Start: 2017-03-20 | End: 2017-03-20

## 2017-03-20 RX ORDER — HYDROCORTISONE 25 MG/G
CREAM TOPICAL 3 TIMES DAILY PRN
Status: DISCONTINUED | OUTPATIENT
Start: 2017-03-20 | End: 2017-03-23 | Stop reason: HOSPADM

## 2017-03-20 RX ORDER — OXYTOCIN/RINGER'S LACTATE 20/1000 ML
41.65 PLASTIC BAG, INJECTION (ML) INTRAVENOUS CONTINUOUS
Status: ACTIVE | OUTPATIENT
Start: 2017-03-20 | End: 2017-03-20

## 2017-03-20 RX ORDER — ONDANSETRON 2 MG/ML
4 INJECTION INTRAMUSCULAR; INTRAVENOUS EVERY 6 HOURS PRN
Status: DISCONTINUED | OUTPATIENT
Start: 2017-03-20 | End: 2017-03-23 | Stop reason: HOSPADM

## 2017-03-20 RX ORDER — OXYCODONE AND ACETAMINOPHEN 10; 325 MG/1; MG/1
1 TABLET ORAL EVERY 4 HOURS PRN
Status: DISCONTINUED | OUTPATIENT
Start: 2017-03-20 | End: 2017-03-23 | Stop reason: HOSPADM

## 2017-03-20 RX ORDER — OXYTOCIN 10 [USP'U]/ML
INJECTION, SOLUTION INTRAMUSCULAR; INTRAVENOUS
Status: DISCONTINUED | OUTPATIENT
Start: 2017-03-20 | End: 2017-03-20

## 2017-03-20 RX ORDER — OXYTOCIN/RINGER'S LACTATE 20/1000 ML
PLASTIC BAG, INJECTION (ML) INTRAVENOUS
Status: DISCONTINUED | OUTPATIENT
Start: 2017-03-20 | End: 2017-03-20

## 2017-03-20 RX ORDER — BISACODYL 10 MG
10 SUPPOSITORY, RECTAL RECTAL ONCE AS NEEDED
Status: ACTIVE | OUTPATIENT
Start: 2017-03-20 | End: 2017-03-20

## 2017-03-20 RX ORDER — PROPOFOL 10 MG/ML
VIAL (ML) INTRAVENOUS
Status: DISCONTINUED | OUTPATIENT
Start: 2017-03-20 | End: 2017-03-20

## 2017-03-20 RX ORDER — ONDANSETRON HYDROCHLORIDE 2 MG/ML
INJECTION, SOLUTION INTRAMUSCULAR; INTRAVENOUS
Status: DISCONTINUED | OUTPATIENT
Start: 2017-03-20 | End: 2017-03-20

## 2017-03-20 RX ORDER — HYDROMORPHONE HYDROCHLORIDE 1 MG/ML
1 INJECTION, SOLUTION INTRAMUSCULAR; INTRAVENOUS; SUBCUTANEOUS EVERY 4 HOURS PRN
Status: DISCONTINUED | OUTPATIENT
Start: 2017-03-20 | End: 2017-03-23 | Stop reason: HOSPADM

## 2017-03-20 RX ORDER — METOCLOPRAMIDE HYDROCHLORIDE 5 MG/ML
INJECTION INTRAMUSCULAR; INTRAVENOUS
Status: DISCONTINUED | OUTPATIENT
Start: 2017-03-20 | End: 2017-03-20

## 2017-03-20 RX ORDER — SODIUM CITRATE AND CITRIC ACID MONOHYDRATE 334; 500 MG/5ML; MG/5ML
SOLUTION ORAL
Status: DISCONTINUED | OUTPATIENT
Start: 2017-03-20 | End: 2017-03-20

## 2017-03-20 RX ADMIN — KETOROLAC TROMETHAMINE 30 MG: 30 INJECTION, SOLUTION INTRAMUSCULAR at 05:03

## 2017-03-20 RX ADMIN — KETOROLAC TROMETHAMINE 30 MG: 30 INJECTION, SOLUTION INTRAMUSCULAR at 11:03

## 2017-03-20 RX ADMIN — PROPOFOL 50 MG: 10 INJECTION, EMULSION INTRAVENOUS at 02:03

## 2017-03-20 RX ADMIN — ROPIVACAINE HYDROCHLORIDE 5 ML: 5 INJECTION, SOLUTION EPIDURAL; INFILTRATION; PERINEURAL at 01:03

## 2017-03-20 RX ADMIN — ROPIVACAINE HYDROCHLORIDE 5 ML: 2 INJECTION, SOLUTION EPIDURAL; INFILTRATION at 04:03

## 2017-03-20 RX ADMIN — FENTANYL CITRATE 50 MCG: 50 INJECTION, SOLUTION INTRAMUSCULAR; INTRAVENOUS at 02:03

## 2017-03-20 RX ADMIN — Medication 4 MILLI-UNITS/MIN: at 11:03

## 2017-03-20 RX ADMIN — CEFAZOLIN SODIUM 1 G: 1 SOLUTION INTRAVENOUS at 09:03

## 2017-03-20 RX ADMIN — Medication 1 ML: at 02:03

## 2017-03-20 RX ADMIN — ONDANSETRON 4 MG: 2 INJECTION, SOLUTION INTRAMUSCULAR; INTRAVENOUS at 01:03

## 2017-03-20 RX ADMIN — SODIUM CITRATE AND CITRIC ACID MONOHYDRATE 30 ML: 500; 334 SOLUTION ORAL at 01:03

## 2017-03-20 RX ADMIN — METOCLOPRAMIDE 10 MG: 5 INJECTION, SOLUTION INTRAMUSCULAR; INTRAVENOUS at 01:03

## 2017-03-20 RX ADMIN — OXYTOCIN 10 UNITS: 10 INJECTION, SOLUTION INTRAMUSCULAR; INTRAVENOUS at 02:03

## 2017-03-20 RX ADMIN — SODIUM CHLORIDE, SODIUM LACTATE, POTASSIUM CHLORIDE, AND CALCIUM CHLORIDE: .6; .31; .03; .02 INJECTION, SOLUTION INTRAVENOUS at 04:03

## 2017-03-20 RX ADMIN — SODIUM CHLORIDE, SODIUM LACTATE, POTASSIUM CHLORIDE, AND CALCIUM CHLORIDE: .6; .31; .03; .02 INJECTION, SOLUTION INTRAVENOUS at 01:03

## 2017-03-20 RX ADMIN — CEFAZOLIN 2 G: 1 INJECTION, POWDER, FOR SOLUTION INTRAVENOUS at 01:03

## 2017-03-20 RX ADMIN — SODIUM CHLORIDE, SODIUM LACTATE, POTASSIUM CHLORIDE, AND CALCIUM CHLORIDE: .6; .31; .03; .02 INJECTION, SOLUTION INTRAVENOUS at 06:03

## 2017-03-20 RX ADMIN — DOCUSATE SODIUM 200 MG: 100 CAPSULE, LIQUID FILLED ORAL at 09:03

## 2017-03-20 RX ADMIN — Medication 2 MILLI-UNITS/MIN: at 10:03

## 2017-03-20 RX ADMIN — SODIUM CHLORIDE, SODIUM LACTATE, POTASSIUM CHLORIDE, AND CALCIUM CHLORIDE: .6; .31; .03; .02 INJECTION, SOLUTION INTRAVENOUS at 10:03

## 2017-03-20 RX ADMIN — Medication 999 ML: at 02:03

## 2017-03-20 RX ADMIN — PROPOFOL 100 MG: 10 INJECTION, EMULSION INTRAVENOUS at 02:03

## 2017-03-20 RX ADMIN — LIDOCAINE HYDROCHLORIDE,EPINEPHRINE BITARTRATE 5 ML: 20; .005 INJECTION, SOLUTION EPIDURAL; INFILTRATION; INTRACAUDAL; PERINEURAL at 01:03

## 2017-03-20 RX ADMIN — Medication 8 ML/HR: at 04:03

## 2017-03-20 RX ADMIN — ACETAMINOPHEN 500 MG: 500 TABLET ORAL at 02:03

## 2017-03-20 NOTE — LACTATION NOTE
This note was copied from a baby's chart.  Assessed first feeding immediately after birth.Baby fed for total of 33 minutes Skin to skin emphasized during recovery related to initial blood sugar level of 39.  Education provided on latch, positioning and importance of baby led feeding on cue (8 or more times daily) and use of hand expression. LATCH score complete. Reviewed the risk associated with use of pacifiers and reasons to avoid artificial nipples. Discussed alternative feed methods should blood sugars require giving EBM/ formula. Encouraged mother to use Breastfeeding Guide to track feedings. Used Breastfeeding Guide and reviewed first alert form, importance/ benefits of exclusive breastfeeding for 6 months, proper handling and storage of breast milk, and available resources available after leaving the hospital, especially [facility] being she is a [facility] participant.. Questions/ Concerns answered. Mother verbalized understanding.

## 2017-03-20 NOTE — NURSING
03/20/17 0033   Provider Notification   Provider Name/Title Dr. Hawley    Method of Notification Phone   Notification Reason Status update       Status update provided.  VS, pain in hips with contractions      No new orders - MD states will be by in AM to visit patient     Natasha Combs RN

## 2017-03-20 NOTE — PROGRESS NOTES
Labor and Delivery Progress Note    S:  Notified by RN that patient with decreased variability and late decelerations again. Pitocin already discontinued and oxygen in place.    O:  BPs mild range.  FHTs: 140 BPM;  Decreased variability with persistent late decelerations  [place]:  q3-5 min  SVE:  2/90/-3    Assessment:  1.  IUP at 40 0/7 weeks  2.  Mild pre-eclampsia  3.  Moderate meconium  4.  Fetal intolerance to labor    Plan:  1.  Primary  delivery.

## 2017-03-20 NOTE — PROGRESS NOTES
Labor and Delivery Progress Note    S:  Pt comfortable with epidural.  Had subtle late decelerations earlier this morning requiring pitocin to be discontinued.  Required labetalol IV once during the night.    O:  BPs remain mild range at this time.  FHTs:140 BPM; + reactive  [place]:  Irregular  SVE:  2/90/-3;  AROM --moderate meconium    Assessment:  1.  IUP at 40 0/7 weeks  2.  Mild pre-eclampsia  3.  Moderate meconium    Plan:  1.  Restart pitocin.   2.  Recheck in 2 hours or sooner PRN.

## 2017-03-20 NOTE — PROGRESS NOTES
"Notified MD of elevated BP's on Ms Nolvia Ray       Blood pressure (!) 164/102, pulse 87, temperature 98.1 °F (36.7 °C), resp. rate 20, height 5' 6" (1.676 m), weight 94.3 kg (208 lb), last menstrual period 06/13/2016, SpO2 98 %, not currently breastfeeding.    YING PLUMMER will electronically place order for IVP labetalol 20 mg    Natasha Combs RN    "

## 2017-03-20 NOTE — ANESTHESIA PROCEDURE NOTES
Epidural    Patient location during procedure: OB   Reason for block: primary anesthetic   Diagnosis: pregnancy   Start time: 3/20/2017 3:56 AM  Timeout: 3/20/2017 3:55 AM  End time: 3/20/2017 4:01 AM  Surgery related to: pregnancy  Staffing  Resident/CRNA: CECILIO CABELLO  Performed by: resident/CRNA   Preanesthetic Checklist  Completed: patient identified, site marked, surgical consent, pre-op evaluation, timeout performed, IV checked, risks and benefits discussed, monitors and equipment checked, anesthesia consent given, hand hygiene performed and patient being monitored  Preparation  Patient position: sitting  Prep: ChloraPrep  Patient monitoring: Pulse Ox and Blood Pressure  Epidural  Skin Anesthetic: lidocaine 1%  Skin Wheal: 3 mL  Administration type: single shot  Approach: midline  Interspace: L2-3  Injection technique: ILANA saline  Needle and Epidural Catheter  Needle type: Tuohy   Needle gauge: 18  Needle length: 3.5 inches  Needle insertion depth: 7 cm  Catheter type: multi-orifice  Catheter size: 20 G  Catheter at skin depth: 14 cm  Test dose: 5 mL of lidocaine 1.5% with Epi 1-to-200,000  Additional Documentation: incremental injection, negative aspiration for heme and CSF, no paresthesia on injection, no signs/symptoms of IV or SA injection, no significant complaints from patient and no significant pain on injection  Needle localization: anatomical landmarks  Medications:  Bolus administered: 5 mL of 0.2% ropivacaine  Volume per aspiration: 2.5 mL  Time between aspirations: 2 minutes  Assessment  Upper dermatomal levels - Left: T10  Right: T10  Lower dermatomal level: T10   Dermatomal levels determined by pinch or prick  Ease of block: easy  Patient's tolerance of the procedure: comfortable throughout block and no complaints

## 2017-03-20 NOTE — NURSING
03/20/17 0313   Provider Notification   Provider Name/Title Dr. Hawley   Method of Notification Phone   Notification Reason Pain;Patient request;Labor status     Patient requesting epidural for pain management    Unable to administer IV sedation d/t fht & minimal to moderate variability   VE findings  RBTO    Patient can get epidural     Natasha CombsRN

## 2017-03-20 NOTE — OP NOTE
Date: 3/20/2017    Procedure: Primary low transverse  section    Surgeon: Terrie Hawley MD    Pre-op Diagnosis:    IUP at 40 0/7 weeks  Mild pre-eclampsia  Fetal intolerance to labor  Moderate meconium    Post-op Diagnosis:    IUP at 40 0/7 weeks  Mild pre-eclampsia  Fetal intolerance to labor  Moderate meconium  Uterine atony    Procedure(s) (LRB):  Primary Low transverse  section    Anesthesia:  Epidural    Estimated Blood Loss: 1100 mL    DVT prophylaxis: Bilateral sequential compression devices    Perioperative antibiotics: Ancef    Findings/Key Components:  Viable male infant in cephalic presentation.  Weight 6 lbs 12.6 oz. Apgars 8/9. Body cord.  Moderate meconium.  Normal appearing uterus, tubes, and ovaries. Uterine atony present.    Specimen: None    Brief HPI:  20 yo  at 40 0/7 weeks presented with contractions and was diagnosed with pre-eclampsia.  Labor was augmented with pitocin. Moderate meconium noted on rupture of membranes.  She was transferred to the OR for primary  section secondary to fetal intolerance to labor.     OPERATIVE NOTE:  The patient was taken to the operating room were spinal anesthesia was found to be adequate. She was prepped and draped in the normal sterile fashion in the dorsal supine position. A jaffe catheter was in place draining clear urine. A pfannenstiel skin incision was then made with the knife and carried through to the underlying layer of fascia with the bovie. The fascia was incised in the midline. The fascial incision was extended laterally with the curved acosta scissors. The superior aspect of the incision was grasped with two kocher clamps and elevated up. The superior aspect of the fascial incision was sharply dissected off. The same technique was used to take down the inferior aspect of the fascia. The muscle was  in the midline and the peritoneum was identified. The peritoneum was elevated up with hemastats and entered sharply.  The peritoneal incision was then extended superiorly and inferiorly with good visualization of the surrounding tissue. The bladder blade was inserted and the vesicouterine peritoneum was identified and a bladder flap was made. A low transverse incision was then made on the uterus and the incision was bluntly extended. The infant's head was grasped and brought to the incision. The assistant then applied gentle fundal pressure and the infant was delivered without difficulty. The nose and mouth was suctioned and the cord was clamped and cut. The infant was then handed off to the awaiting nursery personnel. Cord blood was obtained for the infant's blood type. The placenta was removed, the uterus was exteriorized and cleaned of all clot and debris. The uterine incision was repaired with #1 Vicryl in a running locked fashion. A second layer was used for imbrication. The uterus was returned to the abdomen. A 2-0 Vicryl was used to close the bladder flap. The same suture was used to re-approximate the peritoneum. The muscle was re-approximated with 2-0 Vicryl. The fascia was then closed with 0 Vicryl in a running fashion. The subcutaneous tissue was re-approximated with plain gut. The skin was closed with 3-0 Vicryl. The patient tolerated the procedure well. Sponge, needle, and instrument counts were correct x2.

## 2017-03-20 NOTE — ANESTHESIA PREPROCEDURE EVALUATION
03/20/2017  Nolvia Ray is a 21 y.o., female.    [facility] Anesthesia Evaluation    I have reviewed the Patient Summary Reports.    I have reviewed the Nursing Notes.   I have reviewed the Medications.     Review of Systems  Anesthesia Hx:  No problems with previous Anesthesia    Social:  Non-Smoker, No Alcohol Use    Hematology/Oncology:  Hematology Normal   Oncology Normal     EENT/Dental:EENT/Dental Normal   Cardiovascular:  Cardiovascular Normal Exercise tolerance: good     Pulmonary:  Pulmonary Normal    Renal/:  Renal/ Normal     Hepatic/GI:  Hepatic/GI Normal    Musculoskeletal:  Musculoskeletal Normal    Neurological:  Neurology Normal    Endocrine:  Endocrine Normal    Dermatological:  Skin Normal    Psych:  Psychiatric Normal           Physical Exam  General:  Well nourished    Airway/Jaw/Neck:  Airway Findings: Mouth Opening: Normal Tongue: Normal  General Airway Assessment: Adult  Mallampati: II  TM Distance: Normal, at least 6 cm      Dental:  Dental Findings: In tact             Anesthesia Plan  Type of Anesthesia, risks & benefits discussed:  Anesthesia Type:  epidural  Patient's Preference:   Intra-op Monitoring Plan:   Intra-op Monitoring Plan Comments:   Post Op Pain Control Plan:   Post Op Pain Control Plan Comments:   Induction:   IV  Beta Blocker:  Patient is not currently on a Beta-Blocker (No further documentation required).       Informed Consent: Patient understands risks and agrees with Anesthesia plan.  Questions answered. Anesthesia consent signed with patient.  ASA Score: 2     Day of Surgery Review of History & Physical: I have interviewed and examined the patient. I have reviewed the patient's H&P dated: 3/19/17. There are no significant changes.  H&P update referred to the surgeon.         Ready For Surgery From Anesthesia Perspective.

## 2017-03-20 NOTE — PLAN OF CARE
Problem: Patient Care Overview  Goal: Plan of Care Review  Outcome: Ongoing (interventions implemented as appropriate)  1. Baby Boy Taiwo   2. Epidural Effective   3. Desires to breastfeed  4. Patient calm & pleasant     Patient denies questions or concerns

## 2017-03-20 NOTE — BRIEF OP NOTE
Operative Note     SUMMARY     Surgery Date: 3/20/2017     Surgeon(s) and Role:     * Terrie Hawley MD - Primary    Pre-op Diagnosis:    IUP at 40 0/7 weeks  Mild pre-eclampsia  Fetal intolerance to labor  Moderate meconium    Post-op Diagnosis:    IUP at 40 0/7 weeks  Mild pre-eclampsia  Fetal intolerance to labor  Moderate meconium  Uterine atony    Procedure(s) (LRB):  DELIVERY- SECTION (N/A)--Primary LTCS    Anesthesia: Spinal/Epidural    Findings/Key Components:  Viable male infant in cephalic presentation.  Weight 6 lbs 12.6 oz. Apgars 8/9. Body cord.  Moderate meconium.  Normal appearing uterus, tubes, and ovaries. Uterine atony present.    Estimated Blood Loss: 1100 mL           Specimens     None

## 2017-03-21 PROBLEM — D62 ANEMIA DUE TO BLOOD LOSS, ACUTE: Status: ACTIVE | Noted: 2017-03-21

## 2017-03-21 LAB
BASOPHILS # BLD AUTO: 0.03 K/UL
BASOPHILS NFR BLD: 0.2 %
DIFFERENTIAL METHOD: ABNORMAL
EOSINOPHIL # BLD AUTO: 0 K/UL
EOSINOPHIL NFR BLD: 0.1 %
ERYTHROCYTE [DISTWIDTH] IN BLOOD BY AUTOMATED COUNT: 17.8 %
HCT VFR BLD AUTO: 22.5 %
HGB BLD-MCNC: 7.3 G/DL
LYMPHOCYTES # BLD AUTO: 2.5 K/UL
LYMPHOCYTES NFR BLD: 16.2 %
MCH RBC QN AUTO: 26.2 PG
MCHC RBC AUTO-ENTMCNC: 32.4 %
MCV RBC AUTO: 81 FL
MONOCYTES # BLD AUTO: 1.5 K/UL
MONOCYTES NFR BLD: 10 %
NEUTROPHILS # BLD AUTO: 11.3 K/UL
NEUTROPHILS NFR BLD: 73.5 %
PLATELET # BLD AUTO: 232 K/UL
PMV BLD AUTO: 11 FL
RBC # BLD AUTO: 2.79 M/UL
WBC # BLD AUTO: 15.34 K/UL

## 2017-03-21 PROCEDURE — 36415 COLL VENOUS BLD VENIPUNCTURE: CPT

## 2017-03-21 PROCEDURE — 25000003 PHARM REV CODE 250: Performed by: OBSTETRICS & GYNECOLOGY

## 2017-03-21 PROCEDURE — 63600175 PHARM REV CODE 636 W HCPCS: Performed by: OBSTETRICS & GYNECOLOGY

## 2017-03-21 PROCEDURE — 11000001 HC ACUTE MED/SURG PRIVATE ROOM

## 2017-03-21 PROCEDURE — 27000339 *HC DAILY SUPPLY KIT

## 2017-03-21 PROCEDURE — 85025 COMPLETE CBC W/AUTO DIFF WBC: CPT

## 2017-03-21 RX ORDER — PRENATAL WITH FERROUS FUM AND FOLIC ACID 3080; 920; 120; 400; 22; 1.84; 3; 20; 10; 1; 12; 200; 27; 25; 2 [IU]/1; [IU]/1; MG/1; [IU]/1; MG/1; MG/1; MG/1; MG/1; MG/1; MG/1; UG/1; MG/1; MG/1; MG/1; MG/1
1 TABLET ORAL DAILY
Status: DISCONTINUED | OUTPATIENT
Start: 2017-03-21 | End: 2017-03-23 | Stop reason: HOSPADM

## 2017-03-21 RX ADMIN — KETOROLAC TROMETHAMINE 30 MG: 30 INJECTION, SOLUTION INTRAMUSCULAR at 06:03

## 2017-03-21 RX ADMIN — KETOROLAC TROMETHAMINE 30 MG: 30 INJECTION, SOLUTION INTRAMUSCULAR at 12:03

## 2017-03-21 RX ADMIN — CEFAZOLIN SODIUM 1 G: 1 SOLUTION INTRAVENOUS at 01:03

## 2017-03-21 RX ADMIN — DOCUSATE SODIUM 200 MG: 100 CAPSULE, LIQUID FILLED ORAL at 08:03

## 2017-03-21 RX ADMIN — IRON SUCROSE 100 MG: 20 INJECTION, SOLUTION INTRAVENOUS at 09:03

## 2017-03-21 RX ADMIN — OXYCODONE HYDROCHLORIDE AND ACETAMINOPHEN 1 TABLET: 10; 325 TABLET ORAL at 07:03

## 2017-03-21 RX ADMIN — PRENATAL VIT W/ FE FUMARATE-FA TAB 27-0.8 MG 1 EACH: 27-0.8 TAB at 09:03

## 2017-03-21 RX ADMIN — CEFAZOLIN SODIUM 1 G: 1 SOLUTION INTRAVENOUS at 06:03

## 2017-03-21 RX ADMIN — IBUPROFEN 800 MG: 800 TABLET ORAL at 07:03

## 2017-03-21 RX ADMIN — DOCUSATE SODIUM 200 MG: 100 CAPSULE, LIQUID FILLED ORAL at 07:03

## 2017-03-21 RX ADMIN — OXYCODONE HYDROCHLORIDE AND ACETAMINOPHEN 1 TABLET: 10; 325 TABLET ORAL at 04:03

## 2017-03-21 NOTE — PLAN OF CARE
Problem: Patient Care Overview  Goal: Plan of Care Review  Outcome: Ongoing (interventions implemented as appropriate)  Pt alert and oriented x 3. Denies pain during shift. Up ad jer with minimal assistance. Generalized edema noted with 1+ to lower extremities. Pt pale with low H&H. Denies any dizziness or weakness. Iron piggyback given as ordered. Appetite fair. Denies any nausea.  Voiding without difficulty.  Positive bowel sounds. Abdominal binder in place for comfort.  Fundus firm without massage at 2 below umbilicus.  Pericare provided by self. Pt  Reports scant vaginal bleeding.  Flat affect, pt does not seek information, nor ask questions. Pt does offer information when asked.

## 2017-03-21 NOTE — LACTATION NOTE
Repeative attempts to assist pt and infant with breastfeeding. Pt not aggressive with infant for feedings, needs reinforcement constantly with feeding and education. Pt ok with supplementing formula. Educated pt and assisted with hand pump to help with flat nipples. Mother not comfortable handling infant.

## 2017-03-21 NOTE — PLAN OF CARE
Problem: Patient Care Overview  Goal: Plan of Care Review  Outcome: Ongoing (interventions implemented as appropriate)  Pt stable during shift. Monitoring vital signs, blood pressure slowly creeping to the higher side but still within normal parameters according to orders. Pt's highest temp was 100.2 but reassessed and back down to 98.4 with next 2 vital checks.  PIV saline locked and secure. Abdominal binder in use for comfort. Abdominal surgical dressing clean, dry, and intact with no visible drainage noted. Vaginal bleeding light, no clots noted. Uterus firm without massage, 2cm below umbilicus. Pt has denied pain throughout entire shift, scheduled Toradol administered as ordered Q6hr, all nonverbal pain indicators absent. Pruitt catheter removed at 0130, pt denies need to void, educated pt of importance of emptying the bladder and will assist to the bathroom at 0530, pt v/u.  Mother baby bonding noted, however pt awkward holding infant and not aggressive with feedings. Continue breastfeeding assistance provided. Pt educated/ informed of updates on infant's status, pt v/u. Questions/ concerns answered.

## 2017-03-22 LAB
ANISOCYTOSIS BLD QL SMEAR: ABNORMAL
BASOPHILS # BLD AUTO: ABNORMAL K/UL
BASOPHILS NFR BLD: 0 %
DIFFERENTIAL METHOD: ABNORMAL
EOSINOPHIL # BLD AUTO: ABNORMAL K/UL
EOSINOPHIL NFR BLD: 0 %
ERYTHROCYTE [DISTWIDTH] IN BLOOD BY AUTOMATED COUNT: 18.7 %
HCT VFR BLD AUTO: 22.1 %
HGB BLD-MCNC: 7 G/DL
HYPOCHROMIA BLD QL SMEAR: ABNORMAL
LYMPHOCYTES # BLD AUTO: ABNORMAL K/UL
LYMPHOCYTES NFR BLD: 15 %
MCH RBC QN AUTO: 26.1 PG
MCHC RBC AUTO-ENTMCNC: 31.7 %
MCV RBC AUTO: 83 FL
METAMYELOCYTES NFR BLD MANUAL: 2 %
MONOCYTES # BLD AUTO: ABNORMAL K/UL
MONOCYTES NFR BLD: 10 %
NEUTROPHILS NFR BLD: 70 %
NEUTS BAND NFR BLD MANUAL: 3 %
PLATELET # BLD AUTO: 254 K/UL
PLATELET BLD QL SMEAR: ABNORMAL
PMV BLD AUTO: 11.1 FL
POLYCHROMASIA BLD QL SMEAR: ABNORMAL
RBC # BLD AUTO: 2.68 M/UL
WBC # BLD AUTO: 17.95 K/UL

## 2017-03-22 PROCEDURE — 36415 COLL VENOUS BLD VENIPUNCTURE: CPT

## 2017-03-22 PROCEDURE — 25000003 PHARM REV CODE 250: Performed by: OBSTETRICS & GYNECOLOGY

## 2017-03-22 PROCEDURE — 85027 COMPLETE CBC AUTOMATED: CPT

## 2017-03-22 PROCEDURE — 11000001 HC ACUTE MED/SURG PRIVATE ROOM

## 2017-03-22 PROCEDURE — 27000339 *HC DAILY SUPPLY KIT

## 2017-03-22 PROCEDURE — 85007 BL SMEAR W/DIFF WBC COUNT: CPT

## 2017-03-22 RX ADMIN — PRENATAL VIT W/ FE FUMARATE-FA TAB 27-0.8 MG 1 EACH: 27-0.8 TAB at 08:03

## 2017-03-22 RX ADMIN — DOCUSATE SODIUM 200 MG: 100 CAPSULE, LIQUID FILLED ORAL at 08:03

## 2017-03-22 RX ADMIN — OXYCODONE HYDROCHLORIDE AND ACETAMINOPHEN 1 TABLET: 5; 325 TABLET ORAL at 05:03

## 2017-03-22 RX ADMIN — DOCUSATE SODIUM 200 MG: 100 CAPSULE, LIQUID FILLED ORAL at 09:03

## 2017-03-22 RX ADMIN — IBUPROFEN 800 MG: 800 TABLET ORAL at 05:03

## 2017-03-22 RX ADMIN — IBUPROFEN 800 MG: 800 TABLET ORAL at 08:03

## 2017-03-22 NOTE — LACTATION NOTE
Assessed 0815 & 1130am feeding today.Baby with labile blood sugars since delivery.Being supplemented with 20cal formula per sns/ff. Mom with DCS Symphony electric pump after breastfeeds. Education provided on latch, positioning use and cleaning of SNS/Finger feeding supplies, & use and cleaning of shells for flat/inverted nipples. Importance of baby led feeding on cue (8 or more times daily) and use of hand expression. LATCH score complete. Assisted with deep latches & supplementing with formula. Reviewed the risk associated with use of pacifiers and reasons to avoid artificial nipples. Encouraged mother to use Breastfeeding Guide to track feedings. All explained to grandmother and demos provided on SNS, Finger feeding and shell usage. Pumping routine as well. Used Breastfeeding Guide and reviewed first alert form, importance/ benefits of breastfeeding for 6 months, proper handling and storage of breast milk, and available resources available after leaving the hospital. Questions/ Concerns answered. Mother verbalized understanding. Reminded to call WIC and support group, warmline, and OP consults here discussed at length.

## 2017-03-22 NOTE — PLAN OF CARE
Problem: Patient Care Overview  Goal: Plan of Care Review  Outcome: Ongoing (interventions implemented as appropriate)  Pt stable during shift. Mother baby bonding noted. Pt still has flat affect and appears to be tired/drained. Mother baby bonding noted. PIV saline locked. Repeat CBC this AM. Fundus firm, vaginal bleeding scant to light. Pt reported passing 2 small clots earlier in the day but denies passing any during shift. Pt showered. Surgical dressing removed, incision assessed, scant amount of dried blood noted to R side of incision. Staples intact, site dried well and telfa dressing applied for comfort. Abdominal binder in use for comfort. Pt denies pain.Pt only reports pain with ambulation to incision and is relieved with resting and pain medications. Pt still needing full assistance and guidance with breastfeeding sessions. Pt not aggressive with breastfeeding. Pt needing to be reinforced with breastfeeding and pumping. Questions/ concerns answered.

## 2017-03-22 NOTE — LACTATION NOTE
This note was copied from a baby's chart.  Worked with baby again for 245pm feeding. Blood sugar 54 prior to feeding. Baby asleep and relunctant to latch. Did suck effectively and sustained latch with no shield for 8 minutes right. Mom needs reinforcement and reminders on techniques. Grandmother at bedside assisting. Grandmother assists with finger feeding as well. Baby difficult to keep awake this feeding. Feeding finished per Lactation. 30ml supplement given. Mom pumps for 20 minutes DCS Symphony with 0ml obtained. Will continue to assist and follow-up after DC.

## 2017-03-22 NOTE — NURSING
Lactation  Mother with flat nipples attempts made to latch. Baby latches for only for 10-15 minutes. Not able to hand express any colostrum. Hand pump used to help pull nipples out. Nipples stay erect only for a few minutes. SNS feedings with Enfamil NB 5-15 mls while baby is latched. Tolerates well. 3pm Afternoon feeds baby is very sleepy and SNS attempted with nipple shield however baby does not get a true latch. Finger/ feeding tube feeding of 8 mls done per nurse. Glucose checks prior to feeds have been stable. Last feeding of shift, baby barely latched, SNS 10ml then finger syirnge feeding tube remainder 5mls. Put mom on electric pump for 20 minutes of stimulation. Educated mother on the medical indication for supplementation, educated on the risk involved with supplementation. Praised mother for her hard work and encouraged to express milk at the same time formula supplement is provided.  Due to inadequate feedings at the breast, education provided on hand expression and pumping. Instructed to continue to pump 8 or more times in 24 hours. Discussed proper cleaning of breast pump parts and collection/ storage of expressed milk. Lactation notified of mothers troubles, will continue to work on feedings. Questions/ Concerns answered. Mother verbalizes understanding.

## 2017-03-22 NOTE — PLAN OF CARE
Full assistance needed with breastfeeding and putting infant to breast. Reinforcements needed with basic infant care. Mother getting better and more at ease with handling infant.

## 2017-03-22 NOTE — PROGRESS NOTES
Postoperative/Postpartum Progress Note    S:  Pt without complaints.  Denies symptoms of anemia. Received IV iron yesterday.  Declines blood transfusion at this time.  Tolerating a regular diet.  No nausea or vomiting.  Pain well controlled. Bleeding minimal.  Voiding well.  + flatus.  Breastfeeding      O:  Temp:  [98.3 °F (36.8 °C)-99.9 °F (37.7 °C)]   Pulse:  [113-119]   Resp:  [12-18]   BP: (132-140)/(73-81)     I/O last 3 completed shifts:  In: 650 [P.O.:450; IV Piggyback:200]  Out: 1600 [Urine:1600]    General:  Resting comfortably  CV:  RRR  LUNGS:  CTA bilaterally  ABD:  Soft, nontender; +BS  INCISION:  Clean, dry, intact. No erythema, induration, or drainage.  Staples in place.  EXT:  No cyanosis or edema    Fundus:  Firm  Lochia:  Minimal  Breasts:  Nontender; nonerythematous    Labs:  Lab Results   Component Value Date    WBC 17.95 (H) 03/22/2017    HGB 7.0 (L) 03/22/2017    HCT 22.1 (L) 03/22/2017    MCV 83 03/22/2017     03/22/2017       O POS    RPR nonreactive  Rubella non-iimmune    Assessment:  1.  POD # 2 S/P Primary LTCS  2.  Rubella non-immune    Plan:  1.  Continue routine postpartum/postoperative care.   2.  Counseled patient on signs/symptoms of anemia.    3.  Anticipate discharge home tomorrow.

## 2017-03-23 VITALS
OXYGEN SATURATION: 96 % | DIASTOLIC BLOOD PRESSURE: 74 MMHG | RESPIRATION RATE: 16 BRPM | HEIGHT: 66 IN | SYSTOLIC BLOOD PRESSURE: 128 MMHG | WEIGHT: 208 LBS | HEART RATE: 97 BPM | BODY MASS INDEX: 33.43 KG/M2 | TEMPERATURE: 97 F

## 2017-03-23 PROCEDURE — 25000003 PHARM REV CODE 250: Performed by: OBSTETRICS & GYNECOLOGY

## 2017-03-23 PROCEDURE — 90710 MMRV VACCINE SC: CPT | Performed by: OBSTETRICS & GYNECOLOGY

## 2017-03-23 PROCEDURE — 90471 IMMUNIZATION ADMIN: CPT | Performed by: OBSTETRICS & GYNECOLOGY

## 2017-03-23 PROCEDURE — 63600175 PHARM REV CODE 636 W HCPCS: Performed by: OBSTETRICS & GYNECOLOGY

## 2017-03-23 RX ORDER — FUROSEMIDE 20 MG/1
20 TABLET ORAL ONCE
Status: COMPLETED | OUTPATIENT
Start: 2017-03-23 | End: 2017-03-23

## 2017-03-23 RX ORDER — OXYCODONE AND ACETAMINOPHEN 5; 325 MG/1; MG/1
1 TABLET ORAL EVERY 4 HOURS PRN
Qty: 15 TABLET | Refills: 0 | Status: SHIPPED | OUTPATIENT
Start: 2017-03-23 | End: 2017-04-04

## 2017-03-23 RX ORDER — IBUPROFEN 800 MG/1
800 TABLET ORAL EVERY 8 HOURS PRN
Qty: 30 TABLET | Refills: 0 | Status: SHIPPED | OUTPATIENT
Start: 2017-03-23 | End: 2017-05-03

## 2017-03-23 RX ORDER — FUROSEMIDE 20 MG/1
20 TABLET ORAL DAILY
Qty: 5 TABLET | Refills: 0 | Status: SHIPPED | OUTPATIENT
Start: 2017-03-23 | End: 2017-04-04

## 2017-03-23 RX ADMIN — MEASLES, MUMPS, AND RUBELLA VIRUS VACCINE LIVE 0.5 ML: 1000; 12500; 1000 INJECTION, POWDER, LYOPHILIZED, FOR SUSPENSION SUBCUTANEOUS at 02:03

## 2017-03-23 RX ADMIN — IBUPROFEN 800 MG: 800 TABLET ORAL at 12:03

## 2017-03-23 RX ADMIN — IBUPROFEN 800 MG: 800 TABLET ORAL at 08:03

## 2017-03-23 RX ADMIN — FUROSEMIDE 20 MG: 20 TABLET ORAL at 03:03

## 2017-03-23 RX ADMIN — PRENATAL VIT W/ FE FUMARATE-FA TAB 27-0.8 MG 1 EACH: 27-0.8 TAB at 09:03

## 2017-03-23 RX ADMIN — DOCUSATE SODIUM 200 MG: 100 CAPSULE, LIQUID FILLED ORAL at 09:03

## 2017-03-23 RX ADMIN — IBUPROFEN 800 MG: 800 TABLET ORAL at 03:03

## 2017-03-23 NOTE — DISCHARGE SUMMARY
Discharge Note      SUMMARY     Admit Date: 3/19/2017    Attending Physician: Terrie Hawley MD     Discharge Physician: Terrie Hawley MD    Discharge Date: 3/23/2017     Admit Diagnosis:  IUP at 39 6/7 weeks; Mild pre-eclampsia    Final Diagnosis: S/P Primary  section; Mild pre-eclampsia; Asymptomatic anemia secondary to acute blood loss    Procedure: Primary low transverse  section; IV iron infusion    Disposition: Home or Self Care    Condition: Stable    Hospital Course: Pt presented at 39 6/7 weeks complaining of contractions.  Was diagnosed with mild pre-eclampsia and labor augmented.  She underwent a primary  section secondary to fetal intolerance to labor.  Her postoperative course was significant for asymptomatic anemia.  She declined blood transfusion.  She met all discharge criteria on postoperative day # 3.    Patient Instructions:   Current Discharge Medication List      START taking these medications    Details   furosemide (LASIX) 20 MG tablet Take 1 tablet (20 mg total) by mouth once daily.  Qty: 5 tablet, Refills: 0      ibuprofen (ADVIL,MOTRIN) 800 MG tablet Take 1 tablet (800 mg total) by mouth every 8 (eight) hours as needed for Pain (cramping).  Qty: 30 tablet, Refills: 0    Associated Diagnoses: Pre-eclampsia in third trimester      oxycodone-acetaminophen (PERCOCET) 5-325 mg per tablet Take 1 tablet by mouth every 4 (four) hours as needed for Pain.  Qty: 15 tablet, Refills: 0    Associated Diagnoses: Pre-eclampsia in third trimester         CONTINUE these medications which have NOT CHANGED    Details   PNV62/FA/OM3/DHA/EPA/FISH OIL (PRENATAL GUMMY ORAL) Take by mouth.             Discharge Procedure Orders (must include Diet, Follow-up, Activity)    Discharge Procedure Orders (must include Diet, Follow-up, Activity)  Diet general          Follow-up Information     Follow up with Terrie Hawley MD On 3/27/2017.    Specialty:  Obstetrics    Why:  postop check    Contact  information:    [address] [address]  [phone]

## 2017-03-23 NOTE — PLAN OF CARE
Problem: Patient Care Overview  Goal: Plan of Care Review  Outcome: Ongoing (interventions implemented as appropriate)  Patient tolerating well, regular diet and activity; ambulating, denies pain; breastfeeding and supplementing for infant glucose issues; verbalized self care and denies needs at this time

## 2017-03-23 NOTE — NURSING
Assessment unchanged, condition stable, pt up ambulating and voiding without difficulty. NADN. Resting comfortably resp e/u

## 2017-03-23 NOTE — PROGRESS NOTES
Doing well this shift. Scant bleeding incision clean and dry. Ambulating well. Moderate swelling to extremities. .   Used Breastfeeding Guide and reviewed first alert form, importance/ benefits of breastfeeding for 6 months, proper handling and storage of breast milk, and available resources available after leaving the hospital. Questions/ Concerns answered. Mother verbalized understanding.     AVS given and explained to patient aware of follow up appointment on Monday. Given appointment for lactation appointment after pediatrician appointment.

## 2017-03-23 NOTE — PROGRESS NOTES
Postoperative/Postpartum Progress Note    S:  Pt without complaints.  Ready for discharge home.  Tolerating a regular diet.  No nausea or vomiting.  Pain well controlled. Bleeding minimal.  Voiding well. + flatus.  Breastfeeding.  Denies symptoms of anemia.  Declines blood transfusion.      O:  Temp:  [97.3 °F (36.3 °C)]   Pulse:  [97]   Resp:  [16]   BP: (128)/(74)          General:  Resting comfortably  CV:  RRR  LUNGS:  CTA bilaterally  ABD:  Soft, nontender; +BS  EXT:  2+ bilateral lower extremity edema    Fundus:  Firm  Lochia:  Minimal  INCISION:  Clean, dry, intact.  No erythema, induration, or drainage. Staples in place.  Breasts:  Nontender; nonerythematous    Labs:  Lab Results   Component Value Date    WBC 17.95 (H) 03/22/2017    HGB 7.0 (L) 03/22/2017    HCT 22.1 (L) 03/22/2017    MCV 83 03/22/2017     03/22/2017       O POS    RPR nonreactive  Rubella non-immune    Assessment:  1.  POD # 3 S/P Primary LTCS  2.  Asymptomatic anemia secondary to acute blood loss in surgery  3.  Rubella non-immune    Plan:  1.  Discharge home. Discharge instructions given. MMR prior to discharge home.   2.  Rx Percocet, Anaprox, Lasix.  Pt to continue prenatal vitamins.   3.  Pelvic rest x 6 weeks.   4.  Follow up with me on 3/27/17 for staple removal/postop check.

## 2017-03-23 NOTE — PROGRESS NOTES
Postpartum Progress Note    S:  Pt without complaints.  Still denies symptoms of anemia.  Tolerating a regular diet.  No nausea or vomiting.  Pain well controlled. Bleeding minimal.  Voiding well.  Breastfeeding      O:  Temp:  [98.7 °F (37.1 °C)-99.9 °F (37.7 °C)]   Pulse:  [116-119]   Resp:  [12]   BP: (133-140)/(80-81)     I/O last 3 completed shifts:  In: 600 [P.O.:450; IV Piggyback:150]  Out: 600 [Urine:600]    General:  Resting comfortably  CV:  RRR  LUNGS:  CTA bilaterally  ABD:  Soft, nontender; +BS  INCISION: clean, dry, intact  EXT:  No cyanosis or edema    Fundus:  Firm  Lochia:  Minimal  Breasts:  Nontender; nonerythematous    Labs:  Lab Results   Component Value Date    WBC 17.95 (H) 03/22/2017    HGB 7.0 (L) 03/22/2017    HCT 22.1 (L) 03/22/2017    MCV 83 03/22/2017     03/22/2017       O POS    RPR nonreactive  Rubella non-immune    Assessment:  1.  POD # 2 S/P Primary LTCS    Plan:  1.  Continue routine postpartum/postopeartive care. MMR prior to discharge home.   2.  Anticipate discharge home tomorrow.

## 2017-03-23 NOTE — DISCHARGE INSTRUCTIONS
Postpartum Discharge Instructions:    · No heavy lifting, straining, frequent rest periods  · Pelvic rest--no douching, tampons, or intercourse until released by MD  · Talk to your doctor about birth control--remember breastfeeding is not a method of birth control  · Notify MD if bleeding becomes heavier than usual and if large clots, painful cramping,or foul odor develops.   Vaginal discharge will lighten and decrease in amount gradually.    · Cleanse perineal area from front to back after urination or having a bowel movement.  · Tub soak or portable sitz bath at home.  Apply clean pad with Epifoam and Tucks to perineal area.  · Episiotomy stitches will dissolve within 2-3 weeks  · If not breastfeeding, wear tight fitting sports bra for 1 week--remove only to bathe  · Remember to keep your breast clean and dry to prevent any cracking  · Notify MD if breast become reddened,swollen, nipples bleed or crack, or fever greater than 100.4  · Notify MD of pain, swelling,  Redness, or heat developing in back of leg especially when foot is flexed toward body  · Look at incision everyday for redness, swelling, or drainage which may indicate infection  · Notify MD of pain not relieved by pain medication.  · Call MD or go to ER for any concerns

## 2017-03-27 ENCOUNTER — POSTPARTUM VISIT (OUTPATIENT)
Dept: OBSTETRICS AND GYNECOLOGY | Facility: CLINIC | Age: 22
End: 2017-03-27
Payer: MEDICAID

## 2017-03-27 VITALS
SYSTOLIC BLOOD PRESSURE: 124 MMHG | RESPIRATION RATE: 13 BRPM | BODY MASS INDEX: 31.82 KG/M2 | DIASTOLIC BLOOD PRESSURE: 74 MMHG | HEART RATE: 68 BPM | WEIGHT: 198 LBS | HEIGHT: 66 IN

## 2017-03-27 DIAGNOSIS — Z98.891 POSTCESAREAN SECTION: Primary | ICD-10-CM

## 2017-03-27 PROBLEM — O14.93 PRE-ECLAMPSIA IN THIRD TRIMESTER: Status: RESOLVED | Noted: 2017-03-19 | Resolved: 2017-03-27

## 2017-03-27 PROBLEM — D62 ANEMIA DUE TO BLOOD LOSS, ACUTE: Status: RESOLVED | Noted: 2017-03-21 | Resolved: 2017-03-27

## 2017-03-27 PROBLEM — O24.410 DIET CONTROLLED GESTATIONAL DIABETES MELLITUS (GDM) IN THIRD TRIMESTER: Status: RESOLVED | Noted: 2017-01-05 | Resolved: 2017-03-27

## 2017-03-27 PROCEDURE — 99999 PR PBB SHADOW E&M-EST. PATIENT-LVL III: CPT | Mod: PBBFAC,,, | Performed by: OBSTETRICS & GYNECOLOGY

## 2017-03-27 PROCEDURE — 99213 OFFICE O/P EST LOW 20 MIN: CPT | Mod: PBBFAC | Performed by: OBSTETRICS & GYNECOLOGY

## 2017-03-27 PROCEDURE — 99024 POSTOP FOLLOW-UP VISIT: CPT | Mod: ,,, | Performed by: OBSTETRICS & GYNECOLOGY

## 2017-03-27 NOTE — MR AVS SNAPSHOT
[facility]  [address] [place] [address]  Phone: [phone]                  Nolvia Ray   3/27/2017 10:45 AM   Postpartum Visit    Description:  Female : 1995   Provider:  Terrie Hawley MD   Department:  [facility]           Reason for Visit     Postpartum Follow-up           Diagnoses this Visit        Comments    Postcesarean section    -  Primary            To Do List           Future Appointments        Provider Department Dept Phone    2017 9:15 AM Terrie Hawley MD [facility] OB/ -[phone]      Goals (5 Years of Data)     None      Follow-Up and Disposition     Return in about 1 week (around 4/3/2017).      [facility] On Call     [facility] On Call Nurse Care Line -  Assistance  Registered nurses in the [facility] On Call Center provide clinical advisement, health education, appointment booking, and other advisory services.  Call for this free service at [phone].             Medications           Message regarding Medications     Verify the changes and/or additions to your medication regime listed below are the same as discussed with your clinician today.  If any of these changes or additions are incorrect, please notify your healthcare provider.             Verify that the below list of medications is an accurate representation of the medications you are currently taking.  If none reported, the list may be blank. If incorrect, please contact your healthcare provider. Carry this list with you in case of emergency.           Current Medications     furosemide (LASIX) 20 MG tablet Take 1 tablet (20 mg total) by mouth once daily.    ibuprofen (ADVIL,MOTRIN) 800 MG tablet Take 1 tablet (800 mg total) by mouth every 8 (eight) hours as needed for Pain (cramping).    oxycodone-acetaminophen (PERCOCET) 5-325 mg per tablet Take 1 tablet by mouth every 4 (four) hours as needed for Pain.    PNV62/FA/OM3/DHA/EPA/FISH OIL (PRENATAL GUMMY ORAL) Take by  mouth.           Clinical Reference Information           Prenatal Vitals     Enc. Date GA Prenatal Vitals Prenatal Pulse Pain Level Urine Albumin/Glucose Edema Presentation Dilation/Effacement/Station    3/27/17 40w0d 124/74 / 89.8 kg (198 lb)  68         3/21/17 40w0d Admission Dept: [facility] L&DPROC    3/20/17 40w0d Admission Dept: [facility] PERIOP    3/19/17 39w6d Admission Dept: [facility] MOT BAB    3/16/17 39w3d 118/74 / 94.3 kg (208 lb) 39 cm / 143 (u/s) / Present 74 0 Negative / Negative None / None / None / No Vertex 1 / 70 / -3    3/10/17 38w4d 118/76 / 95.7 kg (211 lb) 38 cm / 135 / Present 78 0 Negative / Negative None / None / None / No Vertex 0 / 70 / -3    3/2/17 37w3d 122/82 / 93 kg (205 lb) 37 cm / 138 / Present 76 0 Negative / Negative None / None / None / No Vertex 0 / 70 / -3    2/23/17 36w3d 120/78 / 92.1 kg (203 lb) 36 cm / 125 / Present 76 0 Negative / Negative None / None / None / No Vertex 0 / 50 / -4    2/16/17 35w3d 114/68 / 92.1 kg (203 lb) 35 cm / 161 (u/s) / Present 77 0 Negative / Negative None / None / None / No Vertex 0 / 30 / -4    2/8/17 34w2d 122/82 / 90.3 kg (199 lb) 34 cm / 138 / Present 78 0 Negative / Negative None / None / None / No      1/25/17 32w2d 118/67 / 88 kg (194 lb) 32 cm / 144 (u/s) / Present 76 0 Negative / Negative None / None / None / No Vertex     1/11/17 30w2d 112/76 / 88 kg (194 lb) 31 cm / 140 / Present 72 0 Negative / Negative None / None / None / No  0 / 0 / -5    1/3/17 29w1d 110/70 / 86.2 kg (190 lb) 29 cm / 137 / Present 78 0 Negative / Negative None / None / None / No      12/20/16 27w1d 110/78 / 85.7 kg (189 lb) 27.5 cm / 162 / Present 78 0 Negative / Negative None / None / None      11/29/16 24w1d 120/80 / 84.4 kg (186 lb)  / 148 / Present 82 0 Negative / Negative None / None / None      10/27/16 19w3d 110/70 / 80.7 kg (178 lb)  / 158 (u/s) / Present 85 0 Negative / Negative None / None / None      10/7/16 16w4d 104/69 / 80.3 kg (177 lb)  / 148 79 0 Negative /  "Negative None / None / None      16 12w1d 108/76 / 80.6 kg (177 lb 9.6 oz)  / 167 85 0 Negative / Negative None / None / None         TW.7 kg (28 lb)   Pregravid weight: 81.6 kg (180 lb)   Number of babies: 1   Height: 5' 6" (1.676 m)   BMI: 29.1       Your Vitals Were     BP Pulse Resp Height Weight Last Period    124/74 68 13 5' 6" (1.676 m) 89.8 kg (198 lb) 2016    BMI                31.96 kg/m2          Allergies as of 3/27/2017     No Known Allergies      Immunizations Administered on Date of Encounter - 3/27/2017     None      Language Assistance Services     ATTENTION: Language assistance services are available, free of charge. Please call [phone].      ATENCIÓN: Si habla rona, tiene a montoya disposición servicios gratuitos de asistencia lingüística. Llame al [phone].     CHÚ Ý: N?u b?n nói Ti?ng Vi?t, có các d?ch v? h? tr? ngôn ng? mi?n phí dành cho b?n. G?i s? [phone].         [facility] complies with applicable Federal civil rights laws and does not discriminate on the basis of race, color, national origin, age, disability, or sex.        "

## 2017-03-27 NOTE — PROGRESS NOTES
Subjective:    Patient ID: Nolvia Ray is a 21 y.o. Female.    Chief Complaint:   Chief Complaint   Patient presents with    Postpartum Follow-up       History of Present Illness:   Nolvia is 1 week S/P primary  section who presents for postoperative follow-up. Delivery was complicated by pre-eclampsia and anemia secondary to blood loss. She has no complaints today. She is tolerating a regular diet with no nausea or vomiting.  She is voiding well and having normal bowel movements.  She denies heavy vaginal bleeding, vaginal discharge, or fever. She denies increasing pain but is still requiring pain medication.  She denies symptoms of postpartum depression.  She is bonding well with the baby.  She is breastfeeding.  Has no breast complaints. Swelling has improved.  No symptoms of anemia.     The following portions of the patient's history were reviewed and updated as appropriate: allergies, current medications, past family history, past medical history, past social history, past surgical history and problem list.      Objective:   Vital Signs:  Vitals:    17 1056   BP: 124/74   Pulse: 68   Resp: 13       Physical Exam:  General:  alert,normal appearing female   Abdomen:  soft, non-tender; bowel sounds normal.  Staples removed.  Only erythema present is at site of staple entry.  No induration or drainage. Incision healing well. Incision cleansed with betadine.  Tincture of benzoin applied.  Steri strips placed.    Pelvis: Deferred       Impresssion:  Encounter Diagnosis   Name Primary?    Postcesarean section Yes     Post Op   Section      Plan:  Postcesarean section        Return in ~ 1 week or PRN if having postoperative/postpartum complications  Reviewed postoperative/postpartum precautions and instructions.  She verbalizes understanding.

## 2017-03-30 NOTE — ADDENDUM NOTE
Addendum  created 03/30/17 5317 by Nikita Wright CRNA    Anesthesia Intra Blocks edited, Sign clinical note

## 2017-04-04 ENCOUNTER — POSTPARTUM VISIT (OUTPATIENT)
Dept: OBSTETRICS AND GYNECOLOGY | Facility: CLINIC | Age: 22
End: 2017-04-04
Payer: MEDICAID

## 2017-04-04 VITALS
HEART RATE: 74 BPM | WEIGHT: 189 LBS | RESPIRATION RATE: 14 BRPM | BODY MASS INDEX: 30.37 KG/M2 | DIASTOLIC BLOOD PRESSURE: 76 MMHG | SYSTOLIC BLOOD PRESSURE: 118 MMHG | HEIGHT: 66 IN

## 2017-04-04 DIAGNOSIS — Z98.891 POSTCESAREAN SECTION: Primary | ICD-10-CM

## 2017-04-04 DIAGNOSIS — R10.2 SUPRAPUBIC PRESSURE: ICD-10-CM

## 2017-04-04 DIAGNOSIS — R82.998 LEUKOCYTES IN URINE: ICD-10-CM

## 2017-04-04 PROBLEM — O47.9 THREATENED LABOR AT TERM: Status: RESOLVED | Noted: 2017-03-19 | Resolved: 2017-04-04

## 2017-04-04 LAB
BILIRUB SERPL-MCNC: NEGATIVE MG/DL
BLOOD URINE, POC: NORMAL
COLOR, POC UA: NORMAL
GLUCOSE UR QL STRIP: NORMAL
KETONES UR QL STRIP: NEGATIVE
LEUKOCYTE ESTERASE URINE, POC: NORMAL
NITRITE, POC UA: NEGATIVE
PH, POC UA: 6
PROTEIN, POC: NEGATIVE
SPECIFIC GRAVITY, POC UA: 1
UROBILINOGEN, POC UA: NEGATIVE

## 2017-04-04 PROCEDURE — 99999 PR PBB SHADOW E&M-EST. PATIENT-LVL III: CPT | Mod: PBBFAC,,, | Performed by: OBSTETRICS & GYNECOLOGY

## 2017-04-04 PROCEDURE — 87086 URINE CULTURE/COLONY COUNT: CPT

## 2017-04-04 PROCEDURE — 81001 URINALYSIS AUTO W/SCOPE: CPT | Mod: PBBFAC | Performed by: OBSTETRICS & GYNECOLOGY

## 2017-04-04 PROCEDURE — 99024 POSTOP FOLLOW-UP VISIT: CPT | Mod: ,,, | Performed by: OBSTETRICS & GYNECOLOGY

## 2017-04-04 PROCEDURE — 99213 OFFICE O/P EST LOW 20 MIN: CPT | Mod: PBBFAC | Performed by: OBSTETRICS & GYNECOLOGY

## 2017-04-04 RX ORDER — AMOXICILLIN AND CLAVULANATE POTASSIUM 875; 125 MG/1; MG/1
1 TABLET, FILM COATED ORAL EVERY 12 HOURS
Qty: 6 TABLET | Refills: 0 | Status: SHIPPED | OUTPATIENT
Start: 2017-04-04 | End: 2017-04-14

## 2017-04-04 NOTE — PROGRESS NOTES
Subjective:    Patient ID: Nolvia Ray is a 21 y.o. Female.    Chief Complaint:   Chief Complaint   Patient presents with    Post-op Evaluation       History of Present Illness:   Nolvia is 2 weeks Post Op  Section (primary LTCS) and here for follow up and incision check. Delivery and postpartum course complicated by pre-eclampsia and anemia secondary to blood loss.  Her only complaint today is suprapubic pressure with urination.  Denies dysuria. Denies fever and chills.  Denies any symptoms of anemia.  She is tolerating a regular diet with no nausea or vomiting.  She is having normal bowel movements.  She denies heavy vaginal bleeding, vaginal discharge, or fever. She denies increasing pain and is no longer requiring pain medication.  She denies symptoms of postpartum depression.  She is bonding well with the baby.  She is pumping but milk supply starting to decrease. Has no breast complaints.     The following portions of the patient's history were reviewed and updated as appropriate: allergies, current medications, past family history, past medical history, past social history, past surgical history and problem list.      Objective:   Vital Signs:  Vitals:    17 0921   BP: 118/76   Pulse: 74   Resp: 14       Physical Exam:  General:  alert,normal appearing female   Cardiovascular:  Pulmonary:  Abdomen: Regular rate and rhythm  Clear to auscultation bilaterally   soft, non-tender; bowel sounds normal. Steri strips removed.  Incision healing well.  No erythema, induration, or drainage.    Pelvis: Deferred       Impresssion:  Encounter Diagnoses   Name Primary?    Postcesarean section Yes    Suprapubic pressure      Post Op   Section      Plan:  Postcesarean section    Suprapubic pressure  -     POCT urinalysis, dipstick or tablet reag  -     Urine culture      Rx augmentin sent as leukocytes noted in urine.  Will follow up on urine culture.  Return in 4 weeks or PRN if having  postoperative complications  Reviewed postoperative precautions and instructions.    She verbalizes understanding.

## 2017-04-04 NOTE — MR AVS SNAPSHOT
[facility]  [address] [place] [address]  Phone: [phone]                  Nolvia Ray   2017 9:15 AM   Postpartum Visit    Description:  Female : 1995   Provider:  Terrie Hawley MD   Department:  [facility]           Reason for Visit     Post-op Evaluation           Diagnoses this Visit        Comments    Postcesarean section    -  Primary     Suprapubic pressure                To Do List           Goals (5 Years of Data)     None      Follow-Up and Disposition     Return in about 4 weeks (around 2017).      [facility] On Call     [facility] On Call Nurse Care Line -  Assistance  Unless otherwise directed by your provider, please contact [facility], our nurse care line that is available for  assistance.     Registered nurses in the [facility] On Call Center provide: appointment scheduling, clinical advisement, health education, and other advisory services.  Call: [phone] (toll free)               Medications           Message regarding Medications     Verify the changes and/or additions to your medication regime listed below are the same as discussed with your clinician today.  If any of these changes or additions are incorrect, please notify your healthcare provider.        STOP taking these medications     furosemide (LASIX) 20 MG tablet Take 1 tablet (20 mg total) by mouth once daily.    oxycodone-acetaminophen (PERCOCET) 5-325 mg per tablet Take 1 tablet by mouth every 4 (four) hours as needed for Pain.           Verify that the below list of medications is an accurate representation of the medications you are currently taking.  If none reported, the list may be blank. If incorrect, please contact your healthcare provider. Carry this list with you in case of emergency.           Current Medications     ibuprofen (ADVIL,MOTRIN) 800 MG tablet Take 1 tablet (800 mg total) by mouth every 8 (eight) hours as needed for Pain (cramping).     "PNV62/FA/OM3/DHA/EPA/FISH OIL (PRENATAL GUMMY ORAL) Take by mouth.           Clinical Reference Information           Your Vitals Were     BP Pulse Resp Height Weight Last Period    118/76 74 14 5' 6" (1.676 m) 85.7 kg (189 lb) 03/21/2017    BMI                30.51 kg/m2          Allergies as of 4/4/2017     No Known Allergies      Immunizations Administered on Date of Encounter - 4/4/2017     None      Orders Placed During Today's Visit      Normal Orders This Visit    POCT urinalysis, dipstick or tablet reag     Urine culture       Language Assistance Services     ATTENTION: Language assistance services are available, free of charge. Please call [phone].      ATENCIÓN: Si kodyla rona, tiene a montoya disposición servicios gratuitos de asistencia lingüística. Llame al [phone].     CHÚ Ý: N?u b?n nói Ti?ng Vi?t, có các d?ch v? h? tr? ngôn ng? mi?n phí dành cho b?n. G?i s? [phone].         [facility] complies with applicable Federal civil rights laws and does not discriminate on the basis of race, color, national origin, age, disability, or sex.        "

## 2017-04-06 LAB
BACTERIA UR CULT: NORMAL
BACTERIA UR CULT: NORMAL

## 2017-04-12 NOTE — ADDENDUM NOTE
Addendum  created 04/12/17 1239 by Nikita Wright CRNA    Anesthesia Event edited, Procedure Event Log accessed

## 2017-05-03 ENCOUNTER — POSTPARTUM VISIT (OUTPATIENT)
Dept: OBSTETRICS AND GYNECOLOGY | Facility: CLINIC | Age: 22
End: 2017-05-03
Payer: MEDICAID

## 2017-05-03 VITALS
HEIGHT: 66 IN | HEART RATE: 72 BPM | SYSTOLIC BLOOD PRESSURE: 116 MMHG | WEIGHT: 178 LBS | RESPIRATION RATE: 13 BRPM | BODY MASS INDEX: 28.61 KG/M2 | DIASTOLIC BLOOD PRESSURE: 78 MMHG

## 2017-05-03 DIAGNOSIS — Z30.014 ENCOUNTER FOR INITIAL PRESCRIPTION OF INTRAUTERINE CONTRACEPTIVE DEVICE (IUD): ICD-10-CM

## 2017-05-03 DIAGNOSIS — Z98.891 POSTCESAREAN SECTION: Primary | ICD-10-CM

## 2017-05-03 DIAGNOSIS — Z30.09 CONTRACEPTIVE EDUCATION: ICD-10-CM

## 2017-05-03 PROCEDURE — 99213 OFFICE O/P EST LOW 20 MIN: CPT | Mod: PBBFAC | Performed by: OBSTETRICS & GYNECOLOGY

## 2017-05-03 PROCEDURE — 0503F POSTPARTUM CARE VISIT: CPT | Mod: ,,, | Performed by: OBSTETRICS & GYNECOLOGY

## 2017-05-03 PROCEDURE — 99999 PR PBB SHADOW E&M-EST. PATIENT-LVL III: CPT | Mod: PBBFAC,,, | Performed by: OBSTETRICS & GYNECOLOGY

## 2017-05-03 NOTE — MR AVS SNAPSHOT
"    [facility]  [address] [place] [address]  Phone: [phone]                  Nolvia Ray   5/3/2017 10:00 AM   Postpartum Visit    Description:  Female : 1995   Provider:  Terrie Hawley MD   Department:  [facility]           Reason for Visit     Post-op Evaluation                To Do List           Goals (5 Years of Data)     None      [facility] On Call     [facility] On Call Nurse Care Line -  Assistance  Unless otherwise directed by your provider, please contact [facility] On-Call, our nurse care line that is available for  assistance.     Registered nurses in the [facility] On Call Center provide: appointment scheduling, clinical advisement, health education, and other advisory services.  Call: [phone] (toll free)               Medications           Message regarding Medications     Verify the changes and/or additions to your medication regime listed below are the same as discussed with your clinician today.  If any of these changes or additions are incorrect, please notify your healthcare provider.        STOP taking these medications     ibuprofen (ADVIL,MOTRIN) 800 MG tablet Take 1 tablet (800 mg total) by mouth every 8 (eight) hours as needed for Pain (cramping).    PNV62/FA/OM3/DHA/EPA/FISH OIL (PRENATAL GUMMY ORAL) Take by mouth.           Verify that the below list of medications is an accurate representation of the medications you are currently taking.  If none reported, the list may be blank. If incorrect, please contact your healthcare provider. Carry this list with you in case of emergency.           Current Medications            Clinical Reference Information           Your Vitals Were     BP Pulse Resp Height Weight Last Period    116/78 72 13 5' 6" (1.676 m) 80.7 kg (178 lb) 2017    BMI                28.73 kg/m2          Allergies as of 5/3/2017     No Known Allergies      Immunizations Administered on Date of Encounter - " 5/3/2017     None      Language Assistance Services     ATTENTION: Language assistance services are available, free of charge. Please call [phone].      ATENCIÓN: Si habla rona, tiene a montoya disposición servicios gratuitos de asistencia lingüística. Llame al [phone].     CHÚ Ý: N?u b?n nói Ti?ng Vi?t, có các d?ch v? h? tr? ngôn ng? mi?n phí dành cho b?n. G?i s? [phone].         [facility] complies with applicable Federal civil rights laws and does not discriminate on the basis of race, color, national origin, age, disability, or sex.

## 2017-05-03 NOTE — PROGRESS NOTES
Subjective:    Patient ID: Nlovia Ray is a 21 y.o. Female.    Chief Complaint:   Chief Complaint   Patient presents with    Post-op Evaluation       History of Present Illness:   Nolvia is 6 weeks Post Op  Section (primary LTCS) and here for postpartum/postop follow up and incision check.  She has no complaints today. She is tolerating a regular diet with no nausea or vomiting.  She is voiding well and having normal bowel movements.  She denies heavy vaginal bleeding, vaginal discharge, or fever. She denies increasing pain and is no longer requiring pain medication.  She denies symptoms of postpartum depression.  She is bonding well with the baby.  She is bottle feeding and denies any residual milk production.  Has no breast complaints.     The following portions of the patient's history were reviewed and updated as appropriate: allergies, current medications, past family history, past medical history, past social history, past surgical history and problem list.      Objective:   Vital Signs:  Vitals:    17 1011   BP: 116/78   Pulse: 72   Resp: 13       Physical Exam:  General:  alert,normal appearing female   Cardiovascular:  Pulmonary:  Abdomen: Regular rate and rhythm  Clear to auscultation bilaterally   soft, non-tender; bowel sounds normal. Incision well healed.  No erythema, induration, or drainage.    Pelvis: Deferred     Impresssion:  Encounter Diagnoses   Name Primary?    Postcesarean section Yes    Contraceptive education     Encounter for initial prescription of intrauterine contraceptive device (IUD)      Post Op   Section      Plan:  Postcesarean section    Contraceptive education    Encounter for initial prescription of intrauterine contraceptive device (IUD)  -     levonorgestrel (MIRENA) 20 mcg/24 hr (5 years) IUD; 1 each by Intrauterine route once.  Dispense: 1 each; Refill: 0        Return in ~ 1-2 weeks for IUD placement or PRN if having postoperative/postpartum  complications  Reviewed postoperative/postpartum precautions and instructions.    Reviewed contraceptive options. She has chosen: Mirena IUD.  Rx sent/  She verbalizes understanding of all instructions given.

## 2017-05-16 ENCOUNTER — TELEPHONE (OUTPATIENT)
Dept: OBSTETRICS AND GYNECOLOGY | Facility: CLINIC | Age: 22
End: 2017-05-16

## 2017-05-16 NOTE — TELEPHONE ENCOUNTER
----- Message from Lauren Koch sent at 2017 12:02 PM CDT -----  Contact: self  Nolvia Ray  MRN: [id number]  : 1995  PCP: Shannan Isaac  Home Phone      [phone]  Work Phone      Not on file.  [employer]          [phone]      MESSAGE:    Following up on IUD.    PHONE:  [phone]

## 2017-05-18 ENCOUNTER — TELEPHONE (OUTPATIENT)
Dept: OBSTETRICS AND GYNECOLOGY | Facility: CLINIC | Age: 22
End: 2017-05-18

## 2017-05-18 NOTE — TELEPHONE ENCOUNTER
----- Message from Aiden Alvarado PharmD sent at 5/18/2017  9:54 AM CDT -----  ..Patient Name: Nolvia Ray  MRN: [id number]  Device Name: Mirena  Pre-certed with Insurance  Ready for Pickup or delivery  Patient  Responsibility satisfied.

## 2017-05-18 NOTE — TELEPHONE ENCOUNTER
Spoke with the pt and informed her that her iud is ready and scheduled her for insertion on 5/23/17 with Dr. Hawley.

## 2017-06-27 ENCOUNTER — PROCEDURE VISIT (OUTPATIENT)
Dept: OBSTETRICS AND GYNECOLOGY | Facility: CLINIC | Age: 22
End: 2017-06-27
Payer: MEDICAID

## 2017-06-27 VITALS
HEIGHT: 66 IN | BODY MASS INDEX: 32.11 KG/M2 | SYSTOLIC BLOOD PRESSURE: 112 MMHG | HEART RATE: 65 BPM | RESPIRATION RATE: 12 BRPM | DIASTOLIC BLOOD PRESSURE: 78 MMHG | WEIGHT: 199.81 LBS

## 2017-06-27 DIAGNOSIS — Z30.430 ENCOUNTER FOR IUD INSERTION: Primary | ICD-10-CM

## 2017-06-27 LAB
B-HCG UR QL: NEGATIVE
CTP QC/QA: YES

## 2017-06-27 PROCEDURE — 81025 URINE PREGNANCY TEST: CPT | Mod: PBBFAC | Performed by: OBSTETRICS & GYNECOLOGY

## 2017-06-27 PROCEDURE — 58300 INSERT INTRAUTERINE DEVICE: CPT | Mod: PBBFAC | Performed by: OBSTETRICS & GYNECOLOGY

## 2017-06-27 PROCEDURE — 58300 INSERT INTRAUTERINE DEVICE: CPT | Mod: S$PBB,,, | Performed by: OBSTETRICS & GYNECOLOGY

## 2017-06-27 NOTE — PROCEDURES
Procedures   Subjective:    Patient ID: Nolvia Ray is a 21 y.o. y.o. female.     Chief Complaint:   Chief Complaint   Patient presents with    Procedure     iud insertion       History of Present Illness:   Nolvia is a 21 y.o. female  who presents for IUD placement. Patient's last menstrual period was 2017.. She desires insertion of a Mirena IUD. UPT is negative.    IUD PLACEMENT COUNSELING:  Contraceptive options were reviewed and the patient chooses an IUD.  The patient's history was reviewed and there are no contraindications to an IUD. The procedure and minimal risks of pain, bleeding, perforation and infection at the insertion site and spontaneous expulsion within the first two weeks was discussed. The benefits of amenorrhea and no systemic side effects were explained. All questions were answered and the patient agrees to proceed. Consent was signed.    EXAM:  Uterine Position: anteroflexed    PROCEDURE:  TIME OUT PERFORMED.  The cervix visualized with a speculum.  A single tooth tenaculum placed on the anterior lip.  The uterus sounded to 7.5cm using sterile technique.  A Mirena, Lot # [id number], Expiration date  IUD was loaded and placed high in uterine fundus without difficulty using sterile technique.  The string was cut to 2cm length from exo cervix.  The tenaculum and speculum were removed. The patient tolerated the procedure well.    ASSESSMENT:  1. Contraception management / IUD insertion.    POST IUD PLACEMENT COUNSELING:  Manage post IUD placement pain with NSAIDs, Tylenol or Rx given.  IUD danger signs and how to check the strings.  Removal in 5 years for Mirena IUD and in 10 years for Copper IUD.    Counseling lasted approximately 15 minutes and all her questions were answered.

## 2017-08-15 ENCOUNTER — OFFICE VISIT (OUTPATIENT)
Dept: OBSTETRICS AND GYNECOLOGY | Facility: CLINIC | Age: 22
End: 2017-08-15
Payer: MEDICAID

## 2017-08-15 VITALS
RESPIRATION RATE: 12 BRPM | DIASTOLIC BLOOD PRESSURE: 86 MMHG | HEART RATE: 74 BPM | WEIGHT: 199.19 LBS | BODY MASS INDEX: 32.01 KG/M2 | SYSTOLIC BLOOD PRESSURE: 120 MMHG | HEIGHT: 66 IN

## 2017-08-15 DIAGNOSIS — Z30.432 ENCOUNTER FOR IUD REMOVAL: Primary | ICD-10-CM

## 2017-08-15 DIAGNOSIS — Z30.011 ENCOUNTER FOR INITIAL PRESCRIPTION OF CONTRACEPTIVE PILLS: ICD-10-CM

## 2017-08-15 PROCEDURE — 3008F BODY MASS INDEX DOCD: CPT | Mod: ,,, | Performed by: OBSTETRICS & GYNECOLOGY

## 2017-08-15 PROCEDURE — 3079F DIAST BP 80-89 MM HG: CPT | Mod: ,,, | Performed by: OBSTETRICS & GYNECOLOGY

## 2017-08-15 PROCEDURE — 99213 OFFICE O/P EST LOW 20 MIN: CPT | Mod: 25,S$PBB,, | Performed by: OBSTETRICS & GYNECOLOGY

## 2017-08-15 PROCEDURE — 3074F SYST BP LT 130 MM HG: CPT | Mod: ,,, | Performed by: OBSTETRICS & GYNECOLOGY

## 2017-08-15 PROCEDURE — 99213 OFFICE O/P EST LOW 20 MIN: CPT | Mod: PBBFAC,25 | Performed by: OBSTETRICS & GYNECOLOGY

## 2017-08-15 PROCEDURE — 58301 REMOVE INTRAUTERINE DEVICE: CPT | Mod: S$PBB,,, | Performed by: OBSTETRICS & GYNECOLOGY

## 2017-08-15 PROCEDURE — 58301 REMOVE INTRAUTERINE DEVICE: CPT | Mod: PBBFAC | Performed by: OBSTETRICS & GYNECOLOGY

## 2017-08-15 PROCEDURE — 99999 PR PBB SHADOW E&M-EST. PATIENT-LVL III: CPT | Mod: PBBFAC,,, | Performed by: OBSTETRICS & GYNECOLOGY

## 2017-08-15 RX ORDER — LEVONORGESTREL / ETHINYL ESTRADIOL AND ETHINYL ESTRADIOL 150-30(84)
1 KIT ORAL DAILY
Qty: 1 EACH | Refills: 4 | Status: SHIPPED | OUTPATIENT
Start: 2017-08-15 | End: 2018-08-15

## 2017-08-15 NOTE — PROGRESS NOTES
Subjective:    Patient ID: Nolvia Ray is a 21 y.o. female    Chief Complaint:   Chief Complaint   Patient presents with    IUD Check     mirena inserted 6/27/17, pt states she would like to have it taken out due to side effects and pain    Medication Problem       History of Present Illness:  Nolvia presents today desiring change in her contraception. Patient's last menstrual period was 07/05/2017.. Her menstrual cycles are described as still irregular since insertion of IUD. She desires removal of IUD secondary to occasional right sided pain with certain movement as well as a significant change in mood since insertion about 2 months ago. She reports that a family member had this similar problem with mood and her Mirena and that those symptoms resolved with IUD removal.  She desires removal today.  Patient would like to proceed with combination OCPs. History has been reviewed and no contraindications have been identified.  Discussed with patient instructions on taking the birth control as well as safe sex practices. Patient understands that birth control does not protect against STDs.       ROS:   CONSTITUTIONAL: Negative for fever, chills, diaphoresis, weakness, fatigue, weight loss, weight gain  ENT: Negative for sore throat, nasal congestion, nasal discharge, nosebleeds, ringing in ears, or hearing loss  EYES: Negative for blurred vision, decreased vision, loss of vision, eye pain, double vision, light sensitivity, or eye discharge  SKIN: Negative for rash, itching, or hives  RESPIRATORY: Negative for cough, shortness of breath, pleurisy, wheezing  CARDIOVASCULAR: Negative for chest pain, shortness of breath, palpitations, murmur, or fainting  GASTROINTESTINAL: negative for abdominal pain, flank pain, nausea, vomiting, diarrhea, constipation, black stool, blood in stool  GENITOURINARY: irregular menses, pelvic pain, Denies: dysuria, frequency/urgency, hematuria  HEMATOLOGIC/LYMPHATIC: negative for  swollen lymph nodes, bleeding, bruising  MUSCULOSKELETAL: negative for back pain, joint pain, joint stiffness, joint swelling, muscle pain, muscle weakness  ENDOCRINE: negative for polydipsia/polyuria, palpitations, skin changes, temperature intolerance, unexpected weight changes      Objective:    Vital Signs:  Vitals:    08/15/17 0940   BP: 120/86   Pulse: 74   Resp: 12       Physical Exam:  General:  alert, cooperative, no distress   Psych/Neuro: AAOx3, appropriate mood and affect   Head: Normocephalic, atraumatic   Neck:  supple, symmetric with no tracheal deviation   Respiratory: Normal respiratory effort   Skin:  Skin color, texture, turgor normal. No rashes or lesions   Abdomen:  soft, nontender   Pelvis: External genitalia: normal general appearance  Urinary system: urethral meatus normal, bladder nontender  Vaginal: normal mucosa without prolapse or lesions  Cervix: normal appearance, IUD string visualized  Uterus: normal size, shape, position  Adnexa: normal size, nontender bilaterally   Ext: No clubbing, cyanosis, edema       EXAM:  Cervix: IUD string visible.. No lesions or discharge.    PROCEDURE:  TIME OUT PERFORMED.  The cervix visualized with a speculum.  The IUD string was visualized and grasped with a sponge forceps. With gently traction the IUD was removed without difficulty.      Assessment:      Encounter Diagnoses   Name Primary?    Encounter for IUD removal Yes    Encounter for initial prescription of contraceptive pills        Plan:    1. Encounter for IUD removal  IUD removed without difficulty    POST IUD REMOVAL COUNSELING:  Manage post IUD removal pain with NSAIDs, Tylenol or Rx given.  Report heavy bleeding, fever, continued pain.  Use alternative birth control until one normal menstrual cycle.    Counseling lasted approximately 15 minutes and all her questions were answered.    2. Encounter for initial prescription of contraceptive pills  - L norgest/e.estradiol-e.estrad 0.15 mg-30  mcg (84)/10 mcg (7) 3MPk; Take 1 tablet by mouth once daily.  Dispense: 1 each; Refill: 4

## 2017-08-22 ENCOUNTER — CLINICAL SUPPORT (OUTPATIENT)
Dept: OCCUPATIONAL MEDICINE | Facility: CLINIC | Age: 22
End: 2017-08-22

## 2017-08-22 DIAGNOSIS — Z02.1 PRE-EMPLOYMENT DRUG TESTING: Primary | ICD-10-CM

## 2017-08-24 ENCOUNTER — TELEPHONE (OUTPATIENT)
Dept: URGENT CARE | Facility: CLINIC | Age: 22
End: 2017-08-24

## 2017-12-20 ENCOUNTER — OFFICE VISIT (OUTPATIENT)
Dept: OBSTETRICS AND GYNECOLOGY | Facility: CLINIC | Age: 22
End: 2017-12-20
Payer: MEDICAID

## 2017-12-20 VITALS
WEIGHT: 196.38 LBS | HEIGHT: 66 IN | HEART RATE: 83 BPM | SYSTOLIC BLOOD PRESSURE: 122 MMHG | BODY MASS INDEX: 31.56 KG/M2 | RESPIRATION RATE: 13 BRPM | DIASTOLIC BLOOD PRESSURE: 76 MMHG

## 2017-12-20 DIAGNOSIS — Z23 FLU VACCINE NEED: ICD-10-CM

## 2017-12-20 DIAGNOSIS — Z11.3 SCREEN FOR STD (SEXUALLY TRANSMITTED DISEASE): ICD-10-CM

## 2017-12-20 DIAGNOSIS — Z01.419 WELL WOMAN EXAM WITH ROUTINE GYNECOLOGICAL EXAM: Primary | ICD-10-CM

## 2017-12-20 DIAGNOSIS — N39.3 SUI (STRESS URINARY INCONTINENCE, FEMALE): ICD-10-CM

## 2017-12-20 LAB
C TRACH DNA SPEC QL NAA+PROBE: NOT DETECTED
N GONORRHOEA DNA SPEC QL NAA+PROBE: NOT DETECTED

## 2017-12-20 PROCEDURE — 99213 OFFICE O/P EST LOW 20 MIN: CPT | Mod: PBBFAC | Performed by: OBSTETRICS & GYNECOLOGY

## 2017-12-20 PROCEDURE — 99999 PR PBB SHADOW E&M-EST. PATIENT-LVL III: CPT | Mod: PBBFAC,,, | Performed by: OBSTETRICS & GYNECOLOGY

## 2017-12-20 PROCEDURE — 90471 IMMUNIZATION ADMIN: CPT | Mod: PBBFAC

## 2017-12-20 PROCEDURE — 87591 N.GONORRHOEAE DNA AMP PROB: CPT

## 2017-12-20 PROCEDURE — 99395 PREV VISIT EST AGE 18-39: CPT | Mod: S$PBB,,, | Performed by: OBSTETRICS & GYNECOLOGY

## 2017-12-20 NOTE — PROGRESS NOTES
Subjective:    Patient ID: Nolvia Ray is a 22 y.o. y.o. female.     Chief Complaint: Annual Well Woman Exam     History of Present Illness:  Nolvia presents today for Annual Well Woman exam. She describes her menses as regular every month without intermenstrual spotting. She denies pelvic pain.  She denies breast tenderness, masses, nipple discharge. She admits to difficulty with urination (YAKOV); no problems with bowel movements. She denies bloating, early satiety, or weight changes. She is sexually active. Contraception is by oral contraceptives (estrogen/progesterone). Requests GC/CT testing.    Flu vaccine today.      Menstrual History:   Patient's last menstrual period was 2017..     OB History      Para Term  AB Living    2 1 1   1 1    SAB TAB Ectopic Multiple Live Births    1     0 1          The following portions of the patient's history were reviewed and updated as appropriate: allergies, current medications, past family history, past medical history, past social history, past surgical history and problem list.    ROS:   CONSTITUTIONAL: Negative for fever, chills, diaphoresis, weakness, fatigue, weight loss, weight gain  ENT: negative for sore throat, nasal congestion, nasal discharge, epistaxis, tinnitus, hearing loss  EYES: negative for blurry vision, decreased vision, loss of vision, eye pain, diplopia, photophobia, discharge  SKIN: Negative for rash, itching, hives  RESPIRATORY: negative for cough, hemoptysis, shortness of breath, pleuritic chest pain, wheezing  CARDIOVASCULAR: negative for chest pain, dyspnea on exertion, orthopnea, paroxysmal nocturnal dyspnea, edema, palpitations  BREAST: negative for breast  tenderness, breast mass, nipple discharge, or skin changes  GASTROINTESTINAL: negative for abdominal pain, flank pain, nausea, vomiting, diarrhea, constipation, black stool, blood in stool  GENITOURINARY: negative for abnormal vaginal bleeding, amenorrhea,  decreased libido, dysuria, genital sores, hematuria, incontinence, menorrhagia, pelvic pain, urinary frequency, vaginal discharge  HEMATOLOGIC/LYMPHATIC: negative for swollen lymph nodes, bleeding, bruising  MUSCULOSKELETAL: negative for back pain, joint pain, joint stiffness, joint swelling, muscle pain, muscle weakness  NEUROLOGICAL: negative for dizzy/vertigo, headache, focal weakness, numbness/tingling, speech problems, loss of consciousness, confusion, memory loss  BEHAVORIAL/PSYCH: negative for anxiety, depression, psychosis  ENDOCRINE: negative for polydipsia/polyuria, palpitations, skin changes, temperature intolerance, unexpected weight changes  ALLERGIC/IMMUNOLOGIC: negative for urticaria, hay fever, angioedema      Objective:    Vital Signs:  Vitals:    12/20/17 0805   BP: 122/76   Pulse: 83   Resp: 13       Physical Exam:  General:  alert, cooperative, no distress   Skin:  Skin color, texture, turgor normal. No rashes or lesions   HEENT:  extra ocular movement intact, sclera clear, anicteric   Neck: supple, trachea midline, no adenopathy or thyromegally   Respiratory:  Normal effort   Breasts:  no discharge, erythema, or tenderness, no skin dimpling or peau d'orange   Abdomen:  soft, nontender, no palpable masses   Pelvis: External genitalia: normal general appearance  Urinary system: urethral meatus normal, bladder nontender  Vaginal: normal mucosa without prolapse or lesions  Cervix: normal appearance  Uterus: normal size, shape, position  Adnexa: normal size, nontender bilaterally   Extremities: Normal ROM; no edema, no cyanosis   Neurologial: Normal strength and tone. No focal numbness or weakness   Psychiatric: normal mood, speech, dress, and thought processes         Assessment:       Healthy female exam.     1. Well woman exam with routine gynecological exam    2. Flu vaccine need    3. Screen for STD (sexually transmitted disease)    4. YAKOV (stress urinary incontinence, female)          Plan:       1. Well woman exam with routine gynecological exam  Pap up to date    2. Flu vaccine need  - Influenza - Quadrivalent (3 years & older) (PF)    3. Screen for STD (sexually transmitted disease)  - C. trachomatis/N. gonorrhoeae by AMP DNA Cervix    4. YAKOV (stress urinary incontinence, female)  Kegel exercises.  Pelvic PT if not improved.       COUNSELING:  Nolvia was counseled on STD pevention, use and side-effects of various contraceptive measures, A.C.O.G. Pap guidelines and recommendations for yearly pelvic exams in addition to recommendations for monthly self breast exams; to see her PCP for other health maintenance.

## 2019-04-16 ENCOUNTER — OCCUPATIONAL HEALTH (OUTPATIENT)
Dept: URGENT CARE | Facility: CLINIC | Age: 24
End: 2019-04-16

## 2019-04-16 DIAGNOSIS — Z02.83 ENCOUNTER FOR DRUG SCREENING: Primary | ICD-10-CM

## 2019-04-16 LAB
CTP QC/QA: YES
POC 10 PANEL DRUG SCREEN: NEGATIVE

## 2019-04-16 PROCEDURE — 80305 POCT RAPID DRUG SCREEN 10 PANEL: ICD-10-PCS | Mod: QW,S$GLB,, | Performed by: NURSE PRACTITIONER

## 2019-04-16 PROCEDURE — 80305 DRUG TEST PRSMV DIR OPT OBS: CPT | Mod: QW,S$GLB,, | Performed by: NURSE PRACTITIONER

## 2021-10-22 ENCOUNTER — INFUSION (OUTPATIENT)
Dept: INFECTIOUS DISEASES | Facility: HOSPITAL | Age: 26
End: 2021-10-22
Attending: FAMILY MEDICINE
Payer: MEDICAID

## 2021-10-22 VITALS
SYSTOLIC BLOOD PRESSURE: 113 MMHG | TEMPERATURE: 98 F | OXYGEN SATURATION: 100 % | DIASTOLIC BLOOD PRESSURE: 75 MMHG | RESPIRATION RATE: 18 BRPM | HEART RATE: 84 BPM

## 2021-10-22 DIAGNOSIS — U07.1 COVID-19: Primary | ICD-10-CM

## 2021-10-22 PROCEDURE — 25000003 PHARM REV CODE 250: Performed by: FAMILY MEDICINE

## 2021-10-22 PROCEDURE — 63600175 PHARM REV CODE 636 W HCPCS: Performed by: FAMILY MEDICINE

## 2021-10-22 PROCEDURE — M0243 CASIRIVI AND IMDEVI INFUSION: HCPCS | Performed by: FAMILY MEDICINE

## 2021-10-22 RX ORDER — SODIUM CHLORIDE 0.9 % (FLUSH) 0.9 %
10 SYRINGE (ML) INJECTION
Status: ACTIVE | OUTPATIENT
Start: 2021-10-22

## 2021-10-22 RX ORDER — ACETAMINOPHEN 325 MG/1
650 TABLET ORAL ONCE AS NEEDED
Status: ACTIVE | OUTPATIENT
Start: 2021-10-22 | End: 2033-03-20

## 2021-10-22 RX ORDER — DIPHENHYDRAMINE HYDROCHLORIDE 50 MG/ML
25 INJECTION INTRAMUSCULAR; INTRAVENOUS ONCE AS NEEDED
Status: ACTIVE | OUTPATIENT
Start: 2021-10-22 | End: 2033-03-20

## 2021-10-22 RX ORDER — EPINEPHRINE 0.3 MG/.3ML
0.3 INJECTION SUBCUTANEOUS
Status: ACTIVE | OUTPATIENT
Start: 2021-10-22

## 2021-10-22 RX ORDER — ALBUTEROL SULFATE 90 UG/1
2 AEROSOL, METERED RESPIRATORY (INHALATION)
Status: ACTIVE | OUTPATIENT
Start: 2021-10-22

## 2021-10-22 RX ORDER — ONDANSETRON 4 MG/1
4 TABLET, ORALLY DISINTEGRATING ORAL ONCE AS NEEDED
Status: ACTIVE | OUTPATIENT
Start: 2021-10-22 | End: 2033-03-20

## 2021-10-22 RX ADMIN — CASIRIVIMAB AND IMDEVIMAB 600 MG: 600; 600 INJECTION, SOLUTION, CONCENTRATE INTRAVENOUS at 11:10

## 2022-11-04 NOTE — PROGRESS NOTES
18 y/o female bib self with c/o right shoulder pain [facility] [employer] 
[facility] 5 days ago. Patient has 8/10 pain upon movement. Patient is able to move arm. 
Denies LOC, fever or chills.



Medical History: Denies

NKDA See ultrasound in viewpoint.

## 2025-06-26 ENCOUNTER — OFFICE VISIT (OUTPATIENT)
Dept: OBSTETRICS AND GYNECOLOGY | Facility: CLINIC | Age: 30
End: 2025-06-26
Payer: MEDICAID

## 2025-06-26 VITALS
HEART RATE: 116 BPM | WEIGHT: 243.5 LBS | BODY MASS INDEX: 39.13 KG/M2 | HEIGHT: 66 IN | SYSTOLIC BLOOD PRESSURE: 118 MMHG | DIASTOLIC BLOOD PRESSURE: 80 MMHG

## 2025-06-26 DIAGNOSIS — Z12.4 CERVICAL CANCER SCREENING: Primary | ICD-10-CM

## 2025-06-26 DIAGNOSIS — Z01.419 ENCOUNTER FOR GYNECOLOGICAL EXAMINATION WITHOUT ABNORMAL FINDING: ICD-10-CM

## 2025-06-26 PROCEDURE — 99203 OFFICE O/P NEW LOW 30 MIN: CPT | Mod: PBBFAC | Performed by: OBSTETRICS & GYNECOLOGY

## 2025-06-26 PROCEDURE — 99999 PR PBB SHADOW E&M-NEW PATIENT-LVL III: CPT | Mod: PBBFAC,,, | Performed by: OBSTETRICS & GYNECOLOGY

## 2025-06-26 RX ORDER — NORGESTIMATE AND ETHINYL ESTRADIOL 0.25-0.035
1 KIT ORAL DAILY
Qty: 28 TABLET | Refills: 12 | Status: SHIPPED | OUTPATIENT
Start: 2025-06-26

## 2025-06-26 NOTE — PROGRESS NOTES
Subjective:       Patient ID: Nolvia Ray is a 29 y.o. female.    Chief Complaint:  Annual Exam (Well woman exam )      History of Present Illness  Patient presents for annual exam.  Patient admits that her cycles have been irregular.  She will sometimes skip months without having a menstrual cycle.  She reports that her cycles last a full 7 days with heavy bleeding and cramping.  Counseling was done and patient would like to start a birth control pill.  She is otherwise without complaints.    Menstrual History:  OB History          2    Para   1    Term   1            AB   1    Living   1         SAB   1    IAB        Ectopic        Multiple   0    Live Births   1                Menarche age:  Patient's last menstrual period was 2025.         Review of Systems  Review of Systems   Constitutional:  Negative for activity change, appetite change, chills, diaphoresis, fatigue, fever and unexpected weight change.   HENT:  Negative for congestion, dental problem, drooling, ear discharge, ear pain, facial swelling, hearing loss, mouth sores, nosebleeds, postnasal drip, rhinorrhea, sinus pressure, sneezing, sore throat, tinnitus, trouble swallowing and voice change.    Eyes:  Negative for photophobia, pain, discharge, redness, itching and visual disturbance.   Respiratory:  Negative for apnea, cough, choking, chest tightness, shortness of breath, wheezing and stridor.    Cardiovascular:  Negative for chest pain, palpitations and leg swelling.   Gastrointestinal:  Negative for abdominal distention, abdominal pain, anal bleeding, blood in stool, constipation, diarrhea, nausea, rectal pain and vomiting.   Endocrine: Negative for cold intolerance, heat intolerance, polydipsia, polyphagia and polyuria.   Genitourinary:  Positive for menstrual problem. Negative for decreased urine volume, difficulty urinating, dyspareunia, dysuria, enuresis, flank pain, frequency, genital sores, hematuria, pelvic  pain, urgency, vaginal bleeding, vaginal discharge and vaginal pain.   Musculoskeletal:  Negative for arthralgias, back pain, gait problem, joint swelling, myalgias, neck pain and neck stiffness.   Skin:  Negative for color change, pallor, rash and wound.   Allergic/Immunologic: Negative for environmental allergies, food allergies and immunocompromised state.   Neurological:  Negative for dizziness, tremors, seizures, syncope, facial asymmetry, speech difficulty, weakness, light-headedness, numbness and headaches.   Hematological:  Negative for adenopathy. Does not bruise/bleed easily.   Psychiatric/Behavioral:  Negative for agitation, behavioral problems, confusion, decreased concentration, dysphoric mood, hallucinations, self-injury, sleep disturbance and suicidal ideas. The patient is not nervous/anxious and is not hyperactive.          Objective:      Physical Exam  Vitals and nursing note reviewed. Exam conducted with a chaperone present.   Constitutional:       Appearance: She is well-developed.   Neck:      Thyroid: No thyromegaly.   Cardiovascular:      Rate and Rhythm: Normal rate and regular rhythm.   Pulmonary:      Effort: Pulmonary effort is normal.      Breath sounds: Normal breath sounds.   Chest:   Breasts:     Breasts are symmetrical.      Right: No inverted nipple, mass, nipple discharge, skin change or tenderness.      Left: No inverted nipple, mass, nipple discharge, skin change or tenderness.   Abdominal:      General: Bowel sounds are normal.      Palpations: Abdomen is soft. There is no mass.      Tenderness: There is no abdominal tenderness.      Hernia: There is no hernia in the left inguinal area or right inguinal area.   Genitourinary:     General: Normal vulva.      Labia:         Right: No rash, tenderness, lesion or injury.         Left: No rash, tenderness, lesion or injury.       Urethra: No prolapse, urethral pain, urethral swelling or urethral lesion.      Vagina: No signs of injury  and foreign body. No vaginal discharge, erythema, tenderness, bleeding, lesions or prolapsed vaginal walls.      Cervix: No cervical motion tenderness, discharge, friability, lesion, erythema, cervical bleeding or eversion.      Uterus: Not deviated, not enlarged, not fixed, not tender and no uterine prolapse.       Adnexa:         Right: No mass, tenderness or fullness.          Left: No mass, tenderness or fullness.        Rectum: No external hemorrhoid.   Musculoskeletal:         General: Normal range of motion.   Lymphadenopathy:      Lower Body: No right inguinal adenopathy. No left inguinal adenopathy.   Skin:     General: Skin is dry.   Neurological:      Mental Status: She is alert and oriented to person, place, and time.      Deep Tendon Reflexes: Reflexes are normal and symmetric.   Psychiatric:         Behavior: Behavior normal.         Thought Content: Thought content normal.         Judgment: Judgment normal.         Assessment:        1. Cervical cancer screening    2. Encounter for gynecological examination without abnormal finding                Plan:         Nolvia was seen today for annual exam.    Diagnoses and all orders for this visit:    Cervical cancer screening  -     Liquid-Based Pap Smear, Screening    Encounter for gynecological examination without abnormal finding

## 2025-07-02 ENCOUNTER — RESULTS FOLLOW-UP (OUTPATIENT)
Dept: OBSTETRICS AND GYNECOLOGY | Facility: CLINIC | Age: 30
End: 2025-07-02

## (undated) DEVICE — SEE MEDLINE ITEM 156931

## (undated) DEVICE — SUT 3/0 36IN COATED VICRYL

## (undated) DEVICE — SUT CTD VICRYL 0 UND BR CT

## (undated) DEVICE — BULB SYRINGE EAR IRRIGATION

## (undated) DEVICE — SUT 0 36IN PDS II VIO MONO

## (undated) DEVICE — SOL WATER STRL IRR 1000ML

## (undated) DEVICE — ELECTRODE REM PLYHSV RETURN 9

## (undated) DEVICE — SUT CHROMIC GUT 2-0 CT-1 27IN

## (undated) DEVICE — PAD UNDERPAD 30X30

## (undated) DEVICE — SOL NACL IRR 1000ML BTL

## (undated) DEVICE — PAD SANITARY OB STERILE

## (undated) DEVICE — CAP BABY BEANIE

## (undated) DEVICE — GOWN SURGEON XLG

## (undated) DEVICE — DURAPREP SURG SCRUB 26ML

## (undated) DEVICE — SEE MEDLINE ITEM 152741

## (undated) DEVICE — STAPLER SKIN ROTATING HEAD

## (undated) DEVICE — SPONGE LAP 18X18 PREWASHED

## (undated) DEVICE — SUT 2-0 VICRYL / CT-1